# Patient Record
Sex: MALE | Race: NATIVE HAWAIIAN OR OTHER PACIFIC ISLANDER | Employment: FULL TIME | URBAN - METROPOLITAN AREA
[De-identification: names, ages, dates, MRNs, and addresses within clinical notes are randomized per-mention and may not be internally consistent; named-entity substitution may affect disease eponyms.]

---

## 2018-06-29 ENCOUNTER — OFFICE VISIT (OUTPATIENT)
Dept: OCCUPATIONAL MEDICINE | Facility: CLINIC | Age: 38
End: 2018-06-29

## 2018-06-29 ENCOUNTER — NON-PROVIDER VISIT (OUTPATIENT)
Dept: OCCUPATIONAL MEDICINE | Facility: CLINIC | Age: 38
End: 2018-06-29

## 2018-06-29 VITALS
SYSTOLIC BLOOD PRESSURE: 124 MMHG | OXYGEN SATURATION: 98 % | BODY MASS INDEX: 38.87 KG/M2 | TEMPERATURE: 98.2 F | HEIGHT: 72 IN | DIASTOLIC BLOOD PRESSURE: 78 MMHG | HEART RATE: 86 BPM | RESPIRATION RATE: 16 BRPM | WEIGHT: 287 LBS

## 2018-06-29 DIAGNOSIS — Z02.89 ENCOUNTER FOR OCCUPATIONAL HEALTH ASSESSMENT: ICD-10-CM

## 2018-06-29 DIAGNOSIS — Z02.89 ENCOUNTER FOR OCCUPATIONAL HEALTH EXAMINATION: ICD-10-CM

## 2018-06-29 PROCEDURE — 92553 AUDIOMETRY AIR & BONE: CPT | Performed by: PREVENTIVE MEDICINE

## 2018-06-29 PROCEDURE — 8915 PR COMPREHENSIVE PHYSICAL: Performed by: PREVENTIVE MEDICINE

## 2018-06-29 RX ORDER — LOSARTAN POTASSIUM 25 MG/1
25 TABLET ORAL DAILY
COMMUNITY
End: 2019-05-10 | Stop reason: SDUPTHER

## 2018-06-29 RX ORDER — ATORVASTATIN CALCIUM 10 MG/1
10 TABLET, FILM COATED ORAL NIGHTLY
COMMUNITY
End: 2019-05-10 | Stop reason: SDUPTHER

## 2018-06-29 ASSESSMENT — VISUAL ACUITY
OS_CC: 20/13
OD_CC: 20/20

## 2018-06-29 NOTE — PROGRESS NOTES
Patient's body mass index is 38.92 kg/m². Exercise and nutrition counseling were performed at this visit.

## 2018-07-27 ENCOUNTER — HOSPITAL ENCOUNTER (OUTPATIENT)
Dept: LAB | Facility: MEDICAL CENTER | Age: 38
End: 2018-07-27
Attending: NURSE PRACTITIONER
Payer: COMMERCIAL

## 2018-07-27 LAB
ALBUMIN SERPL BCP-MCNC: 4.7 G/DL (ref 3.2–4.9)
ALP SERPL-CCNC: 63 U/L (ref 30–99)
ALT SERPL-CCNC: 54 U/L (ref 2–50)
AST SERPL-CCNC: 40 U/L (ref 12–45)
BILIRUB CONJ SERPL-MCNC: 0.1 MG/DL (ref 0.1–0.5)
BILIRUB INDIRECT SERPL-MCNC: 0.6 MG/DL (ref 0–1)
BILIRUB SERPL-MCNC: 0.7 MG/DL (ref 0.1–1.5)
CRP SERPL HS-MCNC: 0.03 MG/DL (ref 0–0.75)
ERYTHROCYTE [SEDIMENTATION RATE] IN BLOOD BY WESTERGREN METHOD: 0 MM/HOUR (ref 0–15)
HAV IGM SERPL QL IA: NEGATIVE
HBV CORE IGM SER QL: NEGATIVE
HBV SURFACE AG SER QL: NEGATIVE
HCV AB SER QL: NEGATIVE
PROT SERPL-MCNC: 7.5 G/DL (ref 6–8.2)

## 2018-07-27 PROCEDURE — 86431 RHEUMATOID FACTOR QUANT: CPT

## 2018-07-27 PROCEDURE — 80076 HEPATIC FUNCTION PANEL: CPT

## 2018-07-27 PROCEDURE — 86225 DNA ANTIBODY NATIVE: CPT

## 2018-07-27 PROCEDURE — 86235 NUCLEAR ANTIGEN ANTIBODY: CPT | Mod: 91

## 2018-07-27 PROCEDURE — 36415 COLL VENOUS BLD VENIPUNCTURE: CPT

## 2018-07-27 PROCEDURE — 86038 ANTINUCLEAR ANTIBODIES: CPT

## 2018-07-27 PROCEDURE — 86140 C-REACTIVE PROTEIN: CPT

## 2018-07-27 PROCEDURE — 86200 CCP ANTIBODY: CPT

## 2018-07-27 PROCEDURE — 80074 ACUTE HEPATITIS PANEL: CPT

## 2018-07-27 PROCEDURE — 85652 RBC SED RATE AUTOMATED: CPT

## 2018-07-30 LAB
CCP IGG SERPL-ACNC: 4 UNITS (ref 0–19)
NUCLEAR IGG SER QL IA: NORMAL
RHEUMATOID FACT SER NEPH-ACNC: <10 IU/ML (ref 0–14)

## 2018-09-12 ENCOUNTER — OFFICE VISIT (OUTPATIENT)
Dept: MEDICAL GROUP | Facility: PHYSICIAN GROUP | Age: 38
End: 2018-09-12
Payer: COMMERCIAL

## 2018-09-12 VITALS
OXYGEN SATURATION: 96 % | TEMPERATURE: 97.3 F | HEART RATE: 88 BPM | BODY MASS INDEX: 38.87 KG/M2 | SYSTOLIC BLOOD PRESSURE: 120 MMHG | HEIGHT: 72 IN | DIASTOLIC BLOOD PRESSURE: 70 MMHG | WEIGHT: 287 LBS | RESPIRATION RATE: 16 BRPM

## 2018-09-12 DIAGNOSIS — Z00.00 WELLNESS EXAMINATION: ICD-10-CM

## 2018-09-12 DIAGNOSIS — M10.9 GOUT, UNSPECIFIED CAUSE, UNSPECIFIED CHRONICITY, UNSPECIFIED SITE: ICD-10-CM

## 2018-09-12 DIAGNOSIS — R74.8 ELEVATED LIVER ENZYMES: ICD-10-CM

## 2018-09-12 DIAGNOSIS — Z76.89 ENCOUNTER TO ESTABLISH CARE WITH NEW DOCTOR: ICD-10-CM

## 2018-09-12 PROCEDURE — 99203 OFFICE O/P NEW LOW 30 MIN: CPT | Performed by: NURSE PRACTITIONER

## 2018-09-12 ASSESSMENT — PATIENT HEALTH QUESTIONNAIRE - PHQ9: CLINICAL INTERPRETATION OF PHQ2 SCORE: 0

## 2018-09-12 NOTE — ASSESSMENT & PLAN NOTE
States that his previous provider was concerned about his liver.  Recent labs reviewed and everything looks ok.  Will recheck labs and go from there.

## 2018-09-12 NOTE — PROGRESS NOTES
Chief Complaint   Patient presents with   • Gout       HISTORY OF PRESENT ILLNESS: Patient is a 37 y.o. male new patient who presents today to discuss the following issues:    Encounter to establish care with new doctor  Is here to establish with a new primary care provider.  Was previously seen by Dr. Flores.    BMI 38.0-38.9,adult  Patient is aware of BMI elevation.  Brief discussion of diet, exercise, and lifestyle modification.      Gout  Chronic in nature, stable on meds.    Elevated liver enzymes  States that his previous provider was concerned about his liver.  Recent labs reviewed and everything looks ok.  Will recheck labs and go from there.      Patient Active Problem List    Diagnosis Date Noted   • Encounter to establish care with new doctor 09/12/2018   • BMI 38.0-38.9,adult 09/12/2018   • Gout 09/12/2018   • Elevated liver enzymes 09/12/2018       Allergies:Patient has no known allergies.    Current Outpatient Prescriptions   Medication Sig Dispense Refill   • Febuxostat (ULORIC PO) Take  by mouth.     • losartan (COZAAR) 25 MG Tab Take 25 mg by mouth every day.     • atorvastatin (LIPITOR) 10 MG Tab Take 10 mg by mouth every evening.       No current facility-administered medications for this visit.        Social History   Substance Use Topics   • Smoking status: Never Smoker   • Smokeless tobacco: Never Used   • Alcohol use Yes       No family status information on file.     Family History   Problem Relation Age of Onset   • Diabetes Paternal Grandmother    • Diabetes Paternal Grandfather        Review of Systems:   Constitutional: Negative for fever, chills, weight loss and malaise/fatigue.   HENT: Negative for ear pain, nosebleeds, congestion, sore throat and neck pain.    Eyes: Negative for blurred vision.   Respiratory: Negative for cough, sputum production, shortness of breath and wheezing.    Cardiovascular: Negative for chest pain, palpitations, orthopnea and leg swelling.    Gastrointestinal: Negative for heartburn, nausea, vomiting and abdominal pain.   Genitourinary: Negative for dysuria, urgency and frequency.   Musculoskeletal: Negative for myalgias, joint pain, and back pain.  Skin: Negative for rash and itching.   Neurological: Negative for dizziness, tingling, tremors, sensory change, focal weakness and headaches.   Endo/Heme/Allergies: Does not bruise/bleed easily.   Psychiatric/Behavioral: Negative for depression, suicidal ideas and memory loss.  The patient is not nervous/anxious and does not have insomnia.    All other systems reviewed and are negative except as in HPI.    Exam:  Blood pressure 120/70, pulse 88, temperature 36.3 °C (97.3 °F), resp. rate 16, height 1.829 m (6'), weight (!) 130.2 kg (287 lb), SpO2 96 %.  General:  Well nourished, well developed male in NAD  Head: Grossly normal.  Neck: Supple without JVD or bruit. Thyroid is not enlarged.  Pulmonary: Clear to ausculation. Normal effort. No rales, ronchi, or wheezing.  Cardiovascular: Regular rate and rhythm without murmur.   Abdomen:  Bowel sounds + x 4. Soft, non-tender, nondistended.  Extremities: No clubbing, cyanosis, or edema.  Skin: Intact with no obvious rashes or lesions.  Neuro: Grossly intact.  Psych: Alert and oriented x 3.  Mood and affect appropriate.    Medical decision-making and discussion: Ryan is here to establish with a new primary care provider.  We reviewed his past medical history and discussed his current medications.  Lab work was ordered. He will sign a records release for his previous provider, he will sign up with [facility], and he will plan to follow-up here as needed.         Assessment/Plan:  1. Encounter to establish care with new doctor     2. BMI 38.0-38.9,adult  Patient identified as having weight management issue.  Appropriate orders and counseling given.   3. Wellness examination  CBC WITH DIFFERENTIAL    COMP METABOLIC PANEL    LIPID PROFILE    VITAMIN D,25 HYDROXY   4.  Gout, unspecified cause, unspecified chronicity, unspecified site     5. Elevated liver enzymes         Return if symptoms worsen or fail to improve.    Please note that this dictation was created using voice recognition software. I have made every reasonable attempt to correct obvious errors, but I expect that there are errors of grammar and possibly content that I did not discover before finalizing the note.

## 2018-10-12 ENCOUNTER — HOSPITAL ENCOUNTER (OUTPATIENT)
Dept: LAB | Facility: MEDICAL CENTER | Age: 38
End: 2018-10-12
Attending: NURSE PRACTITIONER
Payer: COMMERCIAL

## 2018-10-12 DIAGNOSIS — Z00.00 WELLNESS EXAMINATION: ICD-10-CM

## 2018-10-12 LAB
25(OH)D3 SERPL-MCNC: 19 NG/ML (ref 30–100)
ALBUMIN SERPL BCP-MCNC: 4.5 G/DL (ref 3.2–4.9)
ALBUMIN/GLOB SERPL: 1.3 G/DL
ALP SERPL-CCNC: 58 U/L (ref 30–99)
ALT SERPL-CCNC: 61 U/L (ref 2–50)
ANION GAP SERPL CALC-SCNC: 7 MMOL/L (ref 0–11.9)
AST SERPL-CCNC: 41 U/L (ref 12–45)
BASOPHILS # BLD AUTO: 0.6 % (ref 0–1.8)
BASOPHILS # BLD: 0.03 K/UL (ref 0–0.12)
BILIRUB SERPL-MCNC: 0.6 MG/DL (ref 0.1–1.5)
BUN SERPL-MCNC: 17 MG/DL (ref 8–22)
CALCIUM SERPL-MCNC: 9.7 MG/DL (ref 8.5–10.5)
CHLORIDE SERPL-SCNC: 108 MMOL/L (ref 96–112)
CHOLEST SERPL-MCNC: 199 MG/DL (ref 100–199)
CO2 SERPL-SCNC: 24 MMOL/L (ref 20–33)
CREAT SERPL-MCNC: 0.98 MG/DL (ref 0.5–1.4)
EOSINOPHIL # BLD AUTO: 0.22 K/UL (ref 0–0.51)
EOSINOPHIL NFR BLD: 4.4 % (ref 0–6.9)
ERYTHROCYTE [DISTWIDTH] IN BLOOD BY AUTOMATED COUNT: 45.3 FL (ref 35.9–50)
FASTING STATUS PATIENT QL REPORTED: NORMAL
GLOBULIN SER CALC-MCNC: 3.4 G/DL (ref 1.9–3.5)
GLUCOSE SERPL-MCNC: 116 MG/DL (ref 65–99)
HCT VFR BLD AUTO: 50.2 % (ref 42–52)
HDLC SERPL-MCNC: 48 MG/DL
HGB BLD-MCNC: 16.7 G/DL (ref 14–18)
IMM GRANULOCYTES # BLD AUTO: 0.01 K/UL (ref 0–0.11)
IMM GRANULOCYTES NFR BLD AUTO: 0.2 % (ref 0–0.9)
LDLC SERPL CALC-MCNC: 134 MG/DL
LYMPHOCYTES # BLD AUTO: 1.71 K/UL (ref 1–4.8)
LYMPHOCYTES NFR BLD: 34.2 % (ref 22–41)
MCH RBC QN AUTO: 30.5 PG (ref 27–33)
MCHC RBC AUTO-ENTMCNC: 33.3 G/DL (ref 33.7–35.3)
MCV RBC AUTO: 91.8 FL (ref 81.4–97.8)
MONOCYTES # BLD AUTO: 0.54 K/UL (ref 0–0.85)
MONOCYTES NFR BLD AUTO: 10.8 % (ref 0–13.4)
NEUTROPHILS # BLD AUTO: 2.49 K/UL (ref 1.82–7.42)
NEUTROPHILS NFR BLD: 49.8 % (ref 44–72)
NRBC # BLD AUTO: 0 K/UL
NRBC BLD-RTO: 0 /100 WBC
PLATELET # BLD AUTO: 248 K/UL (ref 164–446)
PMV BLD AUTO: 10 FL (ref 9–12.9)
POTASSIUM SERPL-SCNC: 4.1 MMOL/L (ref 3.6–5.5)
PROT SERPL-MCNC: 7.9 G/DL (ref 6–8.2)
RBC # BLD AUTO: 5.47 M/UL (ref 4.7–6.1)
SODIUM SERPL-SCNC: 139 MMOL/L (ref 135–145)
TRIGL SERPL-MCNC: 85 MG/DL (ref 0–149)
WBC # BLD AUTO: 5 K/UL (ref 4.8–10.8)

## 2018-10-12 PROCEDURE — 82306 VITAMIN D 25 HYDROXY: CPT

## 2018-10-12 PROCEDURE — 80053 COMPREHEN METABOLIC PANEL: CPT

## 2018-10-12 PROCEDURE — 80061 LIPID PANEL: CPT

## 2018-10-12 PROCEDURE — 36415 COLL VENOUS BLD VENIPUNCTURE: CPT

## 2018-10-12 PROCEDURE — 85025 COMPLETE CBC W/AUTO DIFF WBC: CPT

## 2018-11-13 ENCOUNTER — OFFICE VISIT (OUTPATIENT)
Dept: MEDICAL GROUP | Facility: PHYSICIAN GROUP | Age: 38
End: 2018-11-13
Payer: COMMERCIAL

## 2018-11-13 VITALS
TEMPERATURE: 98.6 F | SYSTOLIC BLOOD PRESSURE: 124 MMHG | WEIGHT: 293.6 LBS | RESPIRATION RATE: 12 BRPM | HEART RATE: 96 BPM | OXYGEN SATURATION: 94 % | BODY MASS INDEX: 39.77 KG/M2 | DIASTOLIC BLOOD PRESSURE: 88 MMHG | HEIGHT: 72 IN

## 2018-11-13 DIAGNOSIS — E78.5 HYPERLIPIDEMIA, UNSPECIFIED HYPERLIPIDEMIA TYPE: ICD-10-CM

## 2018-11-13 DIAGNOSIS — R73.09 ELEVATED GLUCOSE: ICD-10-CM

## 2018-11-13 DIAGNOSIS — R74.8 ELEVATED LIVER ENZYMES: ICD-10-CM

## 2018-11-13 DIAGNOSIS — R79.89 LOW VITAMIN D LEVEL: ICD-10-CM

## 2018-11-13 PROBLEM — Z76.89 ENCOUNTER TO ESTABLISH CARE WITH NEW DOCTOR: Status: RESOLVED | Noted: 2018-09-12 | Resolved: 2018-11-13

## 2018-11-13 PROCEDURE — 99214 OFFICE O/P EST MOD 30 MIN: CPT | Performed by: NURSE PRACTITIONER

## 2018-11-13 RX ORDER — ERGOCALCIFEROL 1.25 MG/1
CAPSULE ORAL
Qty: 16 CAP | Refills: 0 | Status: SHIPPED | OUTPATIENT
Start: 2018-11-13 | End: 2019-08-22

## 2018-11-14 NOTE — ASSESSMENT & PLAN NOTE
Reviewed recent test results.  His fasting glucose was elevated at 116.  He will work on diet and lifestyle modification and will recheck labs in 3 months.

## 2018-11-14 NOTE — PROGRESS NOTES
Chief Complaint   Patient presents with   • Other     f/v lab results   • Gout     medication       HISTORY OF PRESENT ILLNESS: Patient is a 38 y.o. male established patient who presents today to discuss the following issues:    Elevated glucose  Reviewed recent test results.  His fasting glucose was elevated at 116.  He will work on diet and lifestyle modification and will recheck labs in 3 months.    Elevated liver enzymes  ALT slightly up at 61.  If elevated again in 3 months will proceed with ultrasound.    Hyperlipidemia  Stable on meds.    Low vitamin D level  Ergocalciferol sent to his pharmacy.      Patient Active Problem List    Diagnosis Date Noted   • Elevated glucose 11/13/2018   • Hyperlipidemia 11/13/2018   • Low vitamin D level 11/13/2018   • BMI 38.0-38.9,adult 09/12/2018   • Gout 09/12/2018   • Elevated liver enzymes 09/12/2018       Allergies:Patient has no known allergies.    Current Outpatient Prescriptions   Medication Sig Dispense Refill   • ALLOPURINOL PO Take  by mouth.     • ergocalciferol (DRISDOL) 09924 UNIT capsule Take 1 cap by mouth every day for 10 days, and then take 1 cap by mouth every Tuesday and Saturday until prescription is completed. 16 Cap 0   • losartan (COZAAR) 25 MG Tab Take 25 mg by mouth every day.     • atorvastatin (LIPITOR) 10 MG Tab Take 10 mg by mouth every evening.     • Febuxostat (ULORIC PO) Take  by mouth.       No current facility-administered medications for this visit.        Social History   Substance Use Topics   • Smoking status: Never Smoker   • Smokeless tobacco: Never Used   • Alcohol use Yes       Family Status   Relation Status   • PGMo (Not Specified)   • PGFa (Not Specified)     Family History   Problem Relation Age of Onset   • Diabetes Paternal Grandmother    • Diabetes Paternal Grandfather        Review of Systems:   Constitutional: Negative for fever, chills, weight loss and malaise/fatigue.   HENT: Negative for ear pain, nosebleeds, congestion,  sore throat and neck pain.    Eyes: Negative for blurred vision.   Respiratory: Negative for cough, sputum production, shortness of breath and wheezing.    Cardiovascular: Negative for chest pain, palpitations, orthopnea and leg swelling.   Gastrointestinal: Negative for heartburn, nausea, vomiting and abdominal pain.   Genitourinary: Negative for dysuria, urgency and frequency.   Musculoskeletal: Negative for myalgias, joint pain, and back pain.  Skin: Negative for rash and itching.   Neurological: Negative for dizziness, tingling, tremors, sensory change, focal weakness and headaches.   Endo/Heme/Allergies: Does not bruise/bleed easily.   Psychiatric/Behavioral: Negative for depression, suicidal ideas and memory loss.  The patient is not nervous/anxious and does not have insomnia.    All other systems reviewed and are negative except as in HPI.    Exam:  Blood pressure 124/88, pulse 96, temperature 37 °C (98.6 °F), temperature source Temporal, resp. rate 12, height 1.829 m (6'), weight (!) 133.2 kg (293 lb 9.6 oz), SpO2 94 %.  General:  Well nourished, well developed male in NAD  Head: Grossly normal.  Neck: Supple without JVD or bruit. Thyroid is not enlarged.  Pulmonary: Clear to ausculation. Normal effort. No rales, ronchi, or wheezing.  Cardiovascular: Regular rate and rhythm without murmur.   Abdomen:  Soft, nontender, nondistended.  Extremities: No clubbing, cyanosis, or edema.  Skin: Intact with no obvious rashes or lesions.  Neuro: Grossly intact.  Psych: Alert and oriented x 3.  Mood and affect appropriate.    Medical decision-making and discussion: Ryan is here today for follow-up.  We reviewed recent test results, vitamin d was sent to his pharmacy, he will decrease his sugar intake, and he will recheck labs in 3 months.  He will follow-up here as needed.          Assessment/Plan:  1. Low vitamin D level  ergocalciferol (DRISDOL) 69819 UNIT capsule    VITAMIN D,25 HYDROXY   2. Hyperlipidemia,  unspecified hyperlipidemia type  Lipid Profile   3. Elevated glucose  COMP METABOLIC PANEL    HEMOGLOBIN A1C   4. Elevated liver enzymes         Return if symptoms worsen or fail to improve.    Please note that this dictation was created using voice recognition software. I have made every reasonable attempt to correct obvious errors, but I expect that there are errors of grammar and possibly content that I did not discover before finalizing the note.

## 2018-12-08 DIAGNOSIS — R79.89 LOW VITAMIN D LEVEL: ICD-10-CM

## 2018-12-10 RX ORDER — ERGOCALCIFEROL 1.25 MG/1
CAPSULE ORAL
Refills: 0 | OUTPATIENT
Start: 2018-12-10

## 2018-12-10 NOTE — TELEPHONE ENCOUNTER
Was the patient seen in the last year in this department? Yes    Does patient have an active prescription for medications requested? No     Received Request Via: Pharmacy      Pt met protocol?: Yes  pt last ov 11/2018   25-Hydroxy   Vitamin D 25   Date Value Ref Range Status   10/12/2018 19 (L) 30 - 100 ng/mL Final     Comment:     Adult Ranges:   <20 ng/mL - Deficiency  20-29 ng/mL - Insufficiency   ng/mL - Sufficiency  The [employer] Centaur Vitamin D Assay is standardized to the  [facility] reference measurement procedures, a  reference method for the Vitamin D Standardization Program  (VDSP).  The VDSP aligns patient results among 25 (OH)  Vitamin D methods.

## 2019-05-13 RX ORDER — ATORVASTATIN CALCIUM 20 MG/1
20 TABLET, FILM COATED ORAL DAILY
Qty: 90 TAB | Refills: 1 | Status: SHIPPED | OUTPATIENT
Start: 2019-05-13 | End: 2019-12-05 | Stop reason: SDUPTHER

## 2019-05-13 RX ORDER — LOSARTAN POTASSIUM 50 MG/1
50 TABLET ORAL DAILY
Qty: 90 TAB | Refills: 1 | Status: SHIPPED | OUTPATIENT
Start: 2019-05-13 | End: 2019-12-05 | Stop reason: SDUPTHER

## 2019-05-13 NOTE — TELEPHONE ENCOUNTER
Pt has had OV within the 12 month protocol and lipid panel is current. 6 month supply sent to pharmacy.   Lab Results   Component Value Date/Time    CHOLSTRLTOT 199 10/12/2018 08:00 AM     (H) 10/12/2018 08:00 AM    HDL 48 10/12/2018 08:00 AM    TRIGLYCERIDE 85 10/12/2018 08:00 AM       Lab Results   Component Value Date/Time    SODIUM 139 10/12/2018 08:00 AM    POTASSIUM 4.1 10/12/2018 08:00 AM    CHLORIDE 108 10/12/2018 08:00 AM    CO2 24 10/12/2018 08:00 AM    GLUCOSE 116 (H) 10/12/2018 08:00 AM    BUN 17 10/12/2018 08:00 AM    CREATININE 0.98 10/12/2018 08:00 AM     Lab Results   Component Value Date/Time    ALKPHOSPHAT 58 10/12/2018 08:00 AM    ASTSGOT 41 10/12/2018 08:00 AM    ALTSGPT 61 (H) 10/12/2018 08:00 AM    TBILIRUBIN 0.6 10/12/2018 08:00 AM

## 2019-05-15 RX ORDER — FEBUXOSTAT 80 MG/1
80 TABLET, FILM COATED ORAL DAILY
Qty: 90 TAB | Refills: 3 | Status: SHIPPED | OUTPATIENT
Start: 2019-05-15 | End: 2020-03-25 | Stop reason: SDUPTHER

## 2019-05-21 ENCOUNTER — TELEPHONE (OUTPATIENT)
Dept: MEDICAL GROUP | Facility: PHYSICIAN GROUP | Age: 39
End: 2019-05-21

## 2019-05-21 NOTE — TELEPHONE ENCOUNTER
FINAL PRIOR AUTHORIZATION STATUS:    1.  Name of Medication & Dose: Uloric 80 mg tab     2. Prior Auth Status: Denied.  Reason: medication is excluded from the pt's benefit.      3. Action Taken: Pharmacy Notified: no Patient Notified: no    Insurance states allopurinol is covered

## 2019-05-21 NOTE — TELEPHONE ENCOUNTER
DOCUMENTATION OF PAR STATUS:    1. Name of Medication & Dose: Uloric     2. Name of Prescription Coverage Company & phone #: [employer] [phone]    3. Date Prior Auth Submitted: 05/21/19    4. What information was given to obtain insurance decision? Cover My Meds    5. Prior Auth Status? Pending    6. Patient Notified: no

## 2019-05-22 NOTE — TELEPHONE ENCOUNTER
We should probably clarify with the patient, but I believe that he has tried and failed allopurinol.  He has been stable on Uloric since before he established with me.

## 2019-08-22 ENCOUNTER — OFFICE VISIT (OUTPATIENT)
Dept: MEDICAL GROUP | Facility: PHYSICIAN GROUP | Age: 39
End: 2019-08-22
Payer: COMMERCIAL

## 2019-08-22 VITALS
WEIGHT: 296 LBS | HEIGHT: 72 IN | DIASTOLIC BLOOD PRESSURE: 88 MMHG | OXYGEN SATURATION: 95 % | TEMPERATURE: 98.4 F | SYSTOLIC BLOOD PRESSURE: 118 MMHG | BODY MASS INDEX: 40.09 KG/M2 | RESPIRATION RATE: 16 BRPM | HEART RATE: 75 BPM

## 2019-08-22 DIAGNOSIS — Z00.00 ENCOUNTER FOR WELLNESS EXAMINATION: ICD-10-CM

## 2019-08-22 DIAGNOSIS — R73.09 ELEVATED GLUCOSE: ICD-10-CM

## 2019-08-22 PROCEDURE — 99395 PREV VISIT EST AGE 18-39: CPT | Performed by: NURSE PRACTITIONER

## 2019-08-22 SDOH — HEALTH STABILITY: MENTAL HEALTH: HOW OFTEN DO YOU HAVE A DRINK CONTAINING ALCOHOL?: NEVER

## 2019-08-22 ASSESSMENT — PATIENT HEALTH QUESTIONNAIRE - PHQ9: CLINICAL INTERPRETATION OF PHQ2 SCORE: 0

## 2019-08-22 NOTE — PROGRESS NOTES
Chief Complaint   Patient presents with   • Hypertension     Concerns        HISTORY OF PRESENT ILLNESS: Patient is a 38 y.o. male established patient who presents today to discuss the following issues:    Encounter for wellness examination  Is here for his annual wellness visit.  He has no current questions or concerns.    Elevated glucose  He plans to have his follow-up lab work done soon.      Patient Active Problem List    Diagnosis Date Noted   • Encounter for wellness examination 08/22/2019   • Elevated glucose 11/13/2018   • Hyperlipidemia 11/13/2018   • Low vitamin D level 11/13/2018   • BMI 38.0-38.9,adult 09/12/2018   • Gout 09/12/2018   • Elevated liver enzymes 09/12/2018       Allergies:Patient has no known allergies.    Current Outpatient Medications   Medication Sig Dispense Refill   • febuxostat (ULORIC) 80 MG Tab Take 1 Tab by mouth every day. 90 Tab 3   • atorvastatin (LIPITOR) 20 MG Tab Take 1 Tab by mouth every day. 90 Tab 1   • losartan (COZAAR) 50 MG Tab Take 1 Tab by mouth every day. 90 Tab 1     No current facility-administered medications for this visit.        Social History     Tobacco Use   • Smoking status: Never Smoker   • Smokeless tobacco: Never Used   Substance Use Topics   • Alcohol use: Not Currently     Frequency: Never   • Drug use: No       Family Status   Relation Name Status   • PGMo  (Not Specified)   • PGFa  (Not Specified)     Family History   Problem Relation Age of Onset   • Diabetes Paternal Grandmother    • Diabetes Paternal Grandfather        Review of Systems:   Constitutional: Negative for fever, chills, weight loss and malaise/fatigue.   HENT: Negative for ear pain, nosebleeds, congestion, sore throat and neck pain.    Eyes: Negative for blurred vision.   Respiratory: Negative for cough, sputum production, shortness of breath and wheezing.    Cardiovascular: Negative for chest pain, palpitations, orthopnea and leg swelling.   Gastrointestinal: Negative for heartburn,  nausea, vomiting and abdominal pain.   Genitourinary: Negative for dysuria, urgency and frequency.   Musculoskeletal: Negative for myalgias, joint pain, and back pain.  Skin: Negative for rash and itching.   Neurological: Negative for dizziness, tingling, tremors, sensory change, focal weakness and headaches.   Endo/Heme/Allergies: Does not bruise/bleed easily.   Psychiatric/Behavioral: Negative for depression, suicidal ideas and memory loss.  The patient is not nervous/anxious and does not have insomnia.    All other systems reviewed and are negative except as in HPI.    Exam:  /88   Pulse 75   Temp 36.9 °C (98.4 °F)   Resp 16   Ht 1.829 m (6')   Wt (!) 134.3 kg (296 lb)   SpO2 95%   General:  Well nourished, well developed male in NAD  Head: Grossly normal.  Neck: Supple without JVD or bruit. Thyroid is not enlarged.  Pulmonary: Clear to ausculation. Normal effort. No rales, ronchi, or wheezing.  Cardiovascular: Regular rate and rhythm without murmur.   Abdomen:  Soft, nontender, nondistended.  Extremities: No clubbing, cyanosis, or edema.  Skin: Intact with no obvious rashes or lesions.  Neuro: Grossly intact.  Psych: Alert and oriented x 3.  Mood and affect appropriate.    Medical decision-making and discussion: Ryan is here today to discuss wellness.  [facility] records were reviewed and his form was completed.  He will have his lab work done soon, and he will follow-up here as needed.          Assessment/Plan:  1. Encounter for wellness examination     2. Elevated glucose         Return if symptoms worsen or fail to improve.    Please note that this dictation was created using voice recognition software. I have made every reasonable attempt to correct obvious errors, but I expect that there are errors of grammar and possibly content that I did not discover before finalizing the note.

## 2019-11-15 ENCOUNTER — HOSPITAL ENCOUNTER (OUTPATIENT)
Dept: LAB | Facility: MEDICAL CENTER | Age: 39
End: 2019-11-15
Attending: NURSE PRACTITIONER
Payer: COMMERCIAL

## 2019-12-05 DIAGNOSIS — E78.5 HYPERLIPIDEMIA, UNSPECIFIED HYPERLIPIDEMIA TYPE: ICD-10-CM

## 2019-12-05 DIAGNOSIS — R79.89 LOW VITAMIN D LEVEL: ICD-10-CM

## 2019-12-09 RX ORDER — LOSARTAN POTASSIUM 50 MG/1
TABLET ORAL
Qty: 30 TAB | Refills: 0 | Status: SHIPPED | OUTPATIENT
Start: 2019-12-09 | End: 2020-01-21

## 2019-12-09 RX ORDER — ATORVASTATIN CALCIUM 20 MG/1
TABLET, FILM COATED ORAL
Qty: 30 TAB | Refills: 0 | Status: SHIPPED | OUTPATIENT
Start: 2019-12-09 | End: 2020-01-21

## 2019-12-09 NOTE — TELEPHONE ENCOUNTER
Was the patient seen in the last year in this department? Yes    Does patient have an active prescription for medications requested? No     Received Request Via: Patient      Pt met protocol?: Yes, OV 8/19   Lab Results   Component Value Date/Time    CHOLSTRLTOT 199 10/12/2018 08:00 AM     (H) 10/12/2018 08:00 AM    HDL 48 10/12/2018 08:00 AM    TRIGLYCERIDE 85 10/12/2018 08:00 AM       Lab Results   Component Value Date/Time    SODIUM 139 10/12/2018 08:00 AM    POTASSIUM 4.1 10/12/2018 08:00 AM    CHLORIDE 108 10/12/2018 08:00 AM    CO2 24 10/12/2018 08:00 AM    GLUCOSE 116 (H) 10/12/2018 08:00 AM    BUN 17 10/12/2018 08:00 AM    CREATININE 0.98 10/12/2018 08:00 AM     Lab Results   Component Value Date/Time    ALKPHOSPHAT 58 10/12/2018 08:00 AM    ASTSGOT 41 10/12/2018 08:00 AM    ALTSGPT 61 (H) 10/12/2018 08:00 AM    TBILIRUBIN 0.6 10/12/2018 08:00 AM     BP Readings from Last 1 Encounters:   08/22/19 118/88

## 2019-12-27 ENCOUNTER — HOSPITAL ENCOUNTER (OUTPATIENT)
Dept: LAB | Facility: MEDICAL CENTER | Age: 39
End: 2019-12-27
Attending: NURSE PRACTITIONER
Payer: COMMERCIAL

## 2019-12-27 LAB
25(OH)D3 SERPL-MCNC: 21 NG/ML (ref 30–100)
ALBUMIN SERPL BCP-MCNC: 4.4 G/DL (ref 3.2–4.9)
ALBUMIN/GLOB SERPL: 1.4 G/DL
ALP SERPL-CCNC: 64 U/L (ref 30–99)
ALT SERPL-CCNC: 55 U/L (ref 2–50)
ANION GAP SERPL CALC-SCNC: 6 MMOL/L (ref 0–11.9)
AST SERPL-CCNC: 38 U/L (ref 12–45)
BILIRUB SERPL-MCNC: 0.7 MG/DL (ref 0.1–1.5)
BUN SERPL-MCNC: 13 MG/DL (ref 8–22)
CALCIUM SERPL-MCNC: 9.6 MG/DL (ref 8.5–10.5)
CHLORIDE SERPL-SCNC: 104 MMOL/L (ref 96–112)
CHOLEST SERPL-MCNC: 186 MG/DL (ref 100–199)
CO2 SERPL-SCNC: 27 MMOL/L (ref 20–33)
CREAT SERPL-MCNC: 0.96 MG/DL (ref 0.5–1.4)
EST. AVERAGE GLUCOSE BLD GHB EST-MCNC: 137 MG/DL
FASTING STATUS PATIENT QL REPORTED: NORMAL
GLOBULIN SER CALC-MCNC: 3.2 G/DL (ref 1.9–3.5)
GLUCOSE SERPL-MCNC: 114 MG/DL (ref 65–99)
HBA1C MFR BLD: 6.4 % (ref 0–5.6)
HDLC SERPL-MCNC: 47 MG/DL
LDLC SERPL CALC-MCNC: 110 MG/DL
POTASSIUM SERPL-SCNC: 4.2 MMOL/L (ref 3.6–5.5)
PROT SERPL-MCNC: 7.6 G/DL (ref 6–8.2)
SODIUM SERPL-SCNC: 137 MMOL/L (ref 135–145)
TRIGL SERPL-MCNC: 144 MG/DL (ref 0–149)

## 2019-12-27 PROCEDURE — 36415 COLL VENOUS BLD VENIPUNCTURE: CPT

## 2019-12-27 PROCEDURE — 82306 VITAMIN D 25 HYDROXY: CPT

## 2019-12-27 PROCEDURE — 80053 COMPREHEN METABOLIC PANEL: CPT

## 2019-12-27 PROCEDURE — 80061 LIPID PANEL: CPT

## 2019-12-27 PROCEDURE — 83036 HEMOGLOBIN GLYCOSYLATED A1C: CPT

## 2020-01-17 DIAGNOSIS — E78.5 HYPERLIPIDEMIA, UNSPECIFIED HYPERLIPIDEMIA TYPE: ICD-10-CM

## 2020-01-21 RX ORDER — ATORVASTATIN CALCIUM 20 MG/1
TABLET, FILM COATED ORAL
Qty: 90 TAB | Refills: 1 | Status: SHIPPED | OUTPATIENT
Start: 2020-01-21 | End: 2020-03-23 | Stop reason: SDUPTHER

## 2020-01-21 RX ORDER — LOSARTAN POTASSIUM 50 MG/1
50 TABLET ORAL DAILY
Qty: 90 TAB | Refills: 1 | Status: SHIPPED | OUTPATIENT
Start: 2020-01-21 | End: 2020-03-23 | Stop reason: SDUPTHER

## 2020-02-13 ENCOUNTER — OFFICE VISIT (OUTPATIENT)
Dept: MEDICAL GROUP | Facility: PHYSICIAN GROUP | Age: 40
End: 2020-02-13
Payer: COMMERCIAL

## 2020-02-13 VITALS
RESPIRATION RATE: 18 BRPM | BODY MASS INDEX: 39.82 KG/M2 | HEIGHT: 72 IN | TEMPERATURE: 97.8 F | DIASTOLIC BLOOD PRESSURE: 78 MMHG | WEIGHT: 294 LBS | HEART RATE: 76 BPM | OXYGEN SATURATION: 97 % | SYSTOLIC BLOOD PRESSURE: 122 MMHG

## 2020-02-13 DIAGNOSIS — R73.09 ELEVATED GLUCOSE: ICD-10-CM

## 2020-02-13 DIAGNOSIS — R79.89 LOW VITAMIN D LEVEL: ICD-10-CM

## 2020-02-13 DIAGNOSIS — Z23 NEED FOR VACCINATION: ICD-10-CM

## 2020-02-13 PROBLEM — Z00.00 ENCOUNTER FOR WELLNESS EXAMINATION: Status: RESOLVED | Noted: 2019-08-22 | Resolved: 2020-02-13

## 2020-02-13 PROCEDURE — 90715 TDAP VACCINE 7 YRS/> IM: CPT | Performed by: NURSE PRACTITIONER

## 2020-02-13 PROCEDURE — 99213 OFFICE O/P EST LOW 20 MIN: CPT | Mod: 25 | Performed by: NURSE PRACTITIONER

## 2020-02-13 PROCEDURE — 90471 IMMUNIZATION ADMIN: CPT | Performed by: NURSE PRACTITIONER

## 2020-02-13 RX ORDER — ERGOCALCIFEROL 1.25 MG/1
CAPSULE ORAL
Qty: 16 CAP | Refills: 0 | Status: SHIPPED | OUTPATIENT
Start: 2020-02-13 | End: 2020-09-24

## 2020-02-13 ASSESSMENT — PATIENT HEALTH QUESTIONNAIRE - PHQ9: CLINICAL INTERPRETATION OF PHQ2 SCORE: 0

## 2020-02-13 NOTE — PROGRESS NOTES
Chief Complaint   Patient presents with   • Lab Results       HISTORY OF PRESENT ILLNESS: Patient is a 39 y.o. male established patient who presents today to discuss the following issues:    Need for vaccination  Due for Tdap today.    Low vitamin D level  Vitamin D low.  Ergocalciferol sent to his pharmacy.    Elevated glucose  Fasting glucose was 114, but A1c was 6.4%.  We had a lengthy conversation about decreasing his carb intake.  He states that the biggest issue is white rice, and he will work on decreasing portion sizes.    BMI 39.0-39.9,adult  Patient is aware of BMI elevation.  Brief discussion of diet, exercise, and lifestyle modification.        Patient Active Problem List    Diagnosis Date Noted   • Need for vaccination 02/13/2020   • Elevated glucose 11/13/2018   • Hyperlipidemia 11/13/2018   • Low vitamin D level 11/13/2018   • BMI 39.0-39.9,adult 09/12/2018   • Gout 09/12/2018   • Elevated liver enzymes 09/12/2018       Allergies:Patient has no known allergies.    Current Outpatient Medications   Medication Sig Dispense Refill   • ergocalciferol (DRISDOL) 51641 UNIT capsule Take 1 cap by mouth every day for 10 days, and then take 1 cap by mouth every Tuesday and Saturday until prescription is completed. 16 Cap 0   • atorvastatin (LIPITOR) 20 MG Tab TAKE 1 TABLET BY MOUTH ONCE DAILY 90 Tab 1   • losartan (COZAAR) 50 MG Tab Take 1 Tab by mouth every day. 90 Tab 1   • febuxostat (ULORIC) 80 MG Tab Take 1 Tab by mouth every day. 90 Tab 3     No current facility-administered medications for this visit.        Social History     Tobacco Use   • Smoking status: Never Smoker   • Smokeless tobacco: Never Used   Substance Use Topics   • Alcohol use: Not Currently     Frequency: Never   • Drug use: No       Family Status   Relation Name Status   • PGMo  (Not Specified)   • PGFa  (Not Specified)     Family History   Problem Relation Age of Onset   • Diabetes Paternal Grandmother    • Diabetes Paternal Grandfather         Review of Systems:   Constitutional: Negative for fever, chills, weight loss and malaise/fatigue.   HENT: Negative for ear pain, nosebleeds, congestion, sore throat and neck pain.    Eyes: Negative for blurred vision.   Respiratory: Negative for cough, sputum production, shortness of breath and wheezing.    Cardiovascular: Negative for chest pain, palpitations, orthopnea and leg swelling.   Gastrointestinal: Negative for heartburn, nausea, vomiting and abdominal pain.   Genitourinary: Negative for dysuria, urgency and frequency.   Musculoskeletal: Negative for myalgias, joint pain, and back pain.  Skin: Negative for rash and itching.   Neurological: Negative for dizziness, tingling, tremors, sensory change, focal weakness and headaches.   Endo/Heme/Allergies: Does not bruise/bleed easily.   Psychiatric/Behavioral: Negative for depression, suicidal ideas and memory loss.  The patient is not nervous/anxious and does not have insomnia.    All other systems reviewed and are negative except as in HPI.    Exam:  Blood Pressure 122/78   Pulse 76   Temperature 36.6 °C (97.8 °F)   Respiration 18   Height 1.829 m (6')   Weight (Abnormal) 133.4 kg (294 lb)   Oxygen Saturation 97%   General:  Well nourished, well developed male in NAD  Head: Grossly normal.  Neck: Supple without JVD or bruit. Thyroid is not enlarged.  Pulmonary: Clear to ausculation. Normal effort. No rales, ronchi, or wheezing.  Cardiovascular: Regular rate and rhythm without murmur.   Abdomen:  Soft, nontender, nondistended.  Extremities: No clubbing, cyanosis, or edema.  Skin: Intact with no obvious rashes or lesions.  Neuro: Grossly intact.  Psych: Alert and oriented x 3.  Mood and affect appropriate.    Medical decision-making and discussion: Ryan is here today for follow-up.  His chart was reviewed and his lab results were discussed.  Ergocalciferol was sent to his pharmacy, he will decrease carb intake, and he was given a Tdap shot.  He  will follow-up here as needed.    I have placed the below orders and discussed them with an approved delegating provider. The MA is performing the below orders under the direction of Dr. Garcia, who have provided verbal consent for supervision.            Assessment/Plan:  1. Need for vaccination  Tdap Vaccine =>8YO IM   2. BMI 39.0-39.9,adult  Patient identified as having weight management issue.  Appropriate orders and counseling given.   3. Low vitamin D level     4. Elevated glucose         Return if symptoms worsen or fail to improve.    Please note that this dictation was created using voice recognition software. I have made every reasonable attempt to correct obvious errors, but I expect that there are errors of grammar and possibly content that I did not discover before finalizing the note.

## 2020-02-13 NOTE — ASSESSMENT & PLAN NOTE
Fasting glucose was 114, but A1c was 6.4%.  We had a lengthy conversation about decreasing his carb intake.  He states that the biggest issue is white rice, and he will work on decreasing portion sizes.

## 2020-03-23 DIAGNOSIS — M10.9 GOUT, UNSPECIFIED CAUSE, UNSPECIFIED CHRONICITY, UNSPECIFIED SITE: ICD-10-CM

## 2020-03-23 DIAGNOSIS — E78.5 HYPERLIPIDEMIA, UNSPECIFIED HYPERLIPIDEMIA TYPE: ICD-10-CM

## 2020-03-23 NOTE — TELEPHONE ENCOUNTER
----- Message from Ryan Lora sent at 3/23/2020 12:23 PM PDT -----  Regarding: Prescription Question  Contact: [phone]  Scot Zabala,  Please send my prescriptions to [facility] so I can get them delivered to my home. My insurance no longer covers maintenance medication filled at our local pharmacy.    Losartan Tabs50 mg  Atorvastatin Tabs20 mg  Febuxostat Tabs80 mg    Thanks!  Shannan

## 2020-03-25 RX ORDER — LOSARTAN POTASSIUM 50 MG/1
50 TABLET ORAL DAILY
Qty: 90 TAB | Refills: 1 | Status: SHIPPED | OUTPATIENT
Start: 2020-03-25 | End: 2020-08-31 | Stop reason: SDUPTHER

## 2020-03-25 RX ORDER — FEBUXOSTAT 80 MG/1
80 TABLET, FILM COATED ORAL DAILY
Qty: 90 TAB | Refills: 1 | Status: SHIPPED | OUTPATIENT
Start: 2020-03-25 | End: 2020-08-31 | Stop reason: SDUPTHER

## 2020-03-25 RX ORDER — ATORVASTATIN CALCIUM 20 MG/1
TABLET, FILM COATED ORAL
Qty: 90 TAB | Refills: 1 | Status: SHIPPED | OUTPATIENT
Start: 2020-03-25 | End: 2020-08-31 | Stop reason: SDUPTHER

## 2020-03-25 NOTE — TELEPHONE ENCOUNTER
Refill X 6 months, sent to pharmacy.Pt. Seen in the last 6 months per protocol.   Lab Results   Component Value Date/Time    SODIUM 137 12/27/2019 08:27 AM    POTASSIUM 4.2 12/27/2019 08:27 AM    CHLORIDE 104 12/27/2019 08:27 AM    CO2 27 12/27/2019 08:27 AM    GLUCOSE 114 (H) 12/27/2019 08:27 AM    BUN 13 12/27/2019 08:27 AM    CREATININE 0.96 12/27/2019 08:27 AM     Lab Results   Component Value Date/Time    ALKPHOSPHAT 64 12/27/2019 08:27 AM    ASTSGOT 38 12/27/2019 08:27 AM    ALTSGPT 55 (H) 12/27/2019 08:27 AM    TBILIRUBIN 0.7 12/27/2019 08:27 AM

## 2020-08-31 DIAGNOSIS — E78.5 HYPERLIPIDEMIA, UNSPECIFIED HYPERLIPIDEMIA TYPE: ICD-10-CM

## 2020-08-31 RX ORDER — FEBUXOSTAT 80 MG/1
80 TABLET, FILM COATED ORAL DAILY
Qty: 90 TAB | Refills: 0 | Status: SHIPPED | OUTPATIENT
Start: 2020-08-31 | End: 2020-11-30

## 2020-08-31 RX ORDER — LOSARTAN POTASSIUM 50 MG/1
50 TABLET ORAL DAILY
Qty: 90 TAB | Refills: 0 | Status: SHIPPED | OUTPATIENT
Start: 2020-08-31 | End: 2020-11-30

## 2020-08-31 RX ORDER — ATORVASTATIN CALCIUM 20 MG/1
TABLET, FILM COATED ORAL
Qty: 90 TAB | Refills: 0 | Status: SHIPPED | OUTPATIENT
Start: 2020-08-31 | End: 2020-11-30

## 2020-09-24 ENCOUNTER — OFFICE VISIT (OUTPATIENT)
Dept: MEDICAL GROUP | Facility: PHYSICIAN GROUP | Age: 40
End: 2020-09-24
Payer: COMMERCIAL

## 2020-09-24 VITALS
DIASTOLIC BLOOD PRESSURE: 88 MMHG | OXYGEN SATURATION: 96 % | HEART RATE: 76 BPM | TEMPERATURE: 97.9 F | SYSTOLIC BLOOD PRESSURE: 128 MMHG | RESPIRATION RATE: 16 BRPM | BODY MASS INDEX: 38.74 KG/M2 | WEIGHT: 286 LBS | HEIGHT: 72 IN

## 2020-09-24 DIAGNOSIS — R79.89 LOW VITAMIN D LEVEL: Chronic | ICD-10-CM

## 2020-09-24 DIAGNOSIS — E78.5 HYPERLIPIDEMIA, UNSPECIFIED HYPERLIPIDEMIA TYPE: Chronic | ICD-10-CM

## 2020-09-24 DIAGNOSIS — M10.9 GOUT, UNSPECIFIED CAUSE, UNSPECIFIED CHRONICITY, UNSPECIFIED SITE: Chronic | ICD-10-CM

## 2020-09-24 DIAGNOSIS — R74.8 ELEVATED LIVER ENZYMES: Chronic | ICD-10-CM

## 2020-09-24 DIAGNOSIS — Z00.00 ENCOUNTER FOR WELLNESS EXAMINATION: ICD-10-CM

## 2020-09-24 DIAGNOSIS — R73.09 ELEVATED GLUCOSE: ICD-10-CM

## 2020-09-24 PROCEDURE — 99204 OFFICE O/P NEW MOD 45 MIN: CPT | Performed by: INTERNAL MEDICINE

## 2020-09-24 ASSESSMENT — FIBROSIS 4 INDEX: FIB4 SCORE: 0.81

## 2020-09-24 NOTE — ASSESSMENT & PLAN NOTE
Chronic condition patient has not been taking vitamin D.  He is requesting blood test to follow-up.

## 2020-09-24 NOTE — ASSESSMENT & PLAN NOTE
Previous blood test show mildly elevated ALT.  Patient is asymptomatic.  This is likely caused by fatty liver.  Requesting follow-up blood test for follow-up.

## 2020-09-24 NOTE — ASSESSMENT & PLAN NOTE
Patient present today for a wellness exam and requesting health screening form to be filled out.  No new complaint.

## 2020-09-24 NOTE — PROGRESS NOTES
CC: Establish care  Health screening form to be filled out    HPI: Pt presents today to establish care.   Pt's medical history is notable for:    Encounter for wellness examination  Patient present today for a wellness exam and requesting health screening form to be filled out.  No new complaint.    Gout  Chronic stable condition patient currently taking Uloric.  No gout attacks recently.    Elevated liver enzymes  Previous blood test show mildly elevated ALT.  Patient is asymptomatic.  This is likely caused by fatty liver.  Requesting follow-up blood test for follow-up.    Hyperlipidemia  Chronic stable condition patient is now taking Lipitor.  No side effect reported.  Patient is due for blood test    Low vitamin D level  Chronic condition patient has not been taking vitamin D.  He is requesting blood test to follow-up.    Elevated glucose  This was noted with previous lab test.  Patient is due for blood test for follow-up.              REVIEW OF SYSTEMS:     Constitutional:  no fever / chills   Neurologic: no headaches, no numbness/tingling  Eyes: no changes in vision  ENT: no sore throat, no hearing loss  CV:  no chest pain, no palpitations  Pulmonary: no SOB, no cough    GI: no nausea / vomiting, no diarrhea, no constipation  :  no dysuria, no hematuria       Allergies: Patient has no known allergies.    Current Outpatient Medications Ordered in Epic   Medication Sig Dispense Refill   • atorvastatin (LIPITOR) 20 MG Tab TAKE 1 TABLET BY MOUTH ONCE DAILY 90 Tab 0   • febuxostat (ULORIC) 80 MG Tab Take 1 Tab by mouth every day. 90 Tab 0   • losartan (COZAAR) 50 MG Tab Take 1 Tab by mouth every day. 90 Tab 0     No current Epic-ordered facility-administered medications on file.        Past Medical History:   Diagnosis Date   • Gout         Past Surgical History:   Procedure Laterality Date   • ARTHROSCOPY, KNEE Right 2000    acl, mcl, meniscus repair        Family History   Problem Relation Age of Onset   •  Diabetes Paternal Grandmother    • Diabetes Paternal Grandfather         Social History     Tobacco Use   Smoking Status Never Smoker   Smokeless Tobacco Never Used          Social History     Substance and Sexual Activity   Alcohol Use Not Currently   • Frequency: Never        ---------------------------------------------------------------------    PHYSICAL EXAM:   Vitals:    09/24/20 0711   BP: 128/88   Pulse: 76   Resp: 16   Temp: 36.6 °C (97.9 °F)   SpO2: 96%     Body mass index is 38.79 kg/m².     Constitutional: no acute distress  Eyes: PERRL, EOMI  Ears/nose/mouth: OP no exudates  Neck: supple, no JVD  CV: heart RRR  Resp: normal effort, no wheezing or rales.  GI: abdomen soft, no obvious mass, no tenderness  Neuro: CN 2-12 grossly intact  Skin: no obvious rash noted  Psych: normal mood and affect  ---------------------------------------------------------------------    ASSESSMENT and PLAN:   1. Gout, unspecified cause, unspecified chronicity, unspecified site  Chronic stable condition.  Continue with Uloric.  Diet instruction given    2. Elevated liver enzymes  Lab test for follow-up.  Patient asymptomatic.  - HEMOGLOBIN A1C; Future  - Basic Metabolic Panel; Future  - CBC WITH DIFFERENTIAL; Future  - TSH; Future  - MICROALBUMIN CREAT RATIO URINE; Future  - HEPATIC FUNCTION PANEL; Future  - HEP A AB IGM; Future  - HEP B SURFACE AB; Future  - HEP B SURFACE ANTIGEN; Future  - HEP C VIRUS ANTIBODY; Future    3. Hyperlipidemia, unspecified hyperlipidemia type  Chronic stable condition continue with atorvastatin.  Lipid panel requested.  - Lipid Profile; Future    4. Low vitamin D level  Lab tests ordered.  - VITAMIN D,25 HYDROXY; Future    5. Elevated glucose  Advised the patient regarding low sweet low-carb diet.  Also recommend a regular exercise program.  Lab tests ordered for follow-up.          Return in about 6 months (around 3/24/2021).     PATIENT EDUCATION:  -If any problems should arise, patient was  advised to contact our office or go to ER to be evaluated.  -Advised pt to follow a healthy diet and regular aerobic exercise regimen. Advised pt to avoid alcohol and tobacco use.    Please note that this dictation was created using voice recognition software. I have made every reasonable attempt to correct obvious errors, but it is possible there are errors of grammar and possibly content that I did not discover before finalizing the note.

## 2020-09-24 NOTE — ASSESSMENT & PLAN NOTE
Chronic stable condition patient is now taking Lipitor.  No side effect reported.  Patient is due for blood test

## 2020-11-29 DIAGNOSIS — E78.5 HYPERLIPIDEMIA, UNSPECIFIED HYPERLIPIDEMIA TYPE: ICD-10-CM

## 2020-11-30 RX ORDER — FEBUXOSTAT 80 MG/1
TABLET, FILM COATED ORAL
Qty: 90 TAB | Refills: 3 | Status: SHIPPED | OUTPATIENT
Start: 2020-11-30 | End: 2021-12-01 | Stop reason: SDUPTHER

## 2020-11-30 RX ORDER — LOSARTAN POTASSIUM 50 MG/1
TABLET ORAL
Qty: 90 TAB | Refills: 3 | Status: SHIPPED | OUTPATIENT
Start: 2020-11-30 | End: 2021-12-01 | Stop reason: SDUPTHER

## 2020-11-30 RX ORDER — ATORVASTATIN CALCIUM 20 MG/1
TABLET, FILM COATED ORAL
Qty: 90 TAB | Refills: 3 | Status: SHIPPED | OUTPATIENT
Start: 2020-11-30 | End: 2021-12-01 | Stop reason: SDUPTHER

## 2021-03-03 ENCOUNTER — PATIENT MESSAGE (OUTPATIENT)
Dept: MEDICAL GROUP | Facility: PHYSICIAN GROUP | Age: 41
End: 2021-03-03

## 2021-03-03 ENCOUNTER — HOSPITAL ENCOUNTER (OUTPATIENT)
Dept: LAB | Facility: MEDICAL CENTER | Age: 41
End: 2021-03-03
Attending: INTERNAL MEDICINE
Payer: COMMERCIAL

## 2021-03-03 ENCOUNTER — TELEPHONE (OUTPATIENT)
Dept: MEDICAL GROUP | Facility: PHYSICIAN GROUP | Age: 41
End: 2021-03-03

## 2021-03-03 DIAGNOSIS — R73.03 PREDIABETES: ICD-10-CM

## 2021-03-03 DIAGNOSIS — E78.5 HYPERLIPIDEMIA, UNSPECIFIED HYPERLIPIDEMIA TYPE: Chronic | ICD-10-CM

## 2021-03-03 DIAGNOSIS — R74.8 ELEVATED LIVER ENZYMES: Chronic | ICD-10-CM

## 2021-03-03 DIAGNOSIS — R79.89 LOW VITAMIN D LEVEL: Chronic | ICD-10-CM

## 2021-03-03 DIAGNOSIS — E88.09 HYPERPROTEINEMIA: ICD-10-CM

## 2021-03-03 LAB
25(OH)D3 SERPL-MCNC: 21 NG/ML (ref 30–100)
ALBUMIN SERPL BCP-MCNC: 4.6 G/DL (ref 3.2–4.9)
ALP SERPL-CCNC: 76 U/L (ref 30–99)
ALT SERPL-CCNC: 33 U/L (ref 2–50)
ANION GAP SERPL CALC-SCNC: 11 MMOL/L (ref 7–16)
AST SERPL-CCNC: 33 U/L (ref 12–45)
BASOPHILS # BLD AUTO: 0.4 % (ref 0–1.8)
BASOPHILS # BLD: 0.02 K/UL (ref 0–0.12)
BILIRUB CONJ SERPL-MCNC: <0.2 MG/DL (ref 0.1–0.5)
BILIRUB INDIRECT SERPL-MCNC: ABNORMAL MG/DL (ref 0–1)
BILIRUB SERPL-MCNC: 0.7 MG/DL (ref 0.1–1.5)
BUN SERPL-MCNC: 11 MG/DL (ref 8–22)
CALCIUM SERPL-MCNC: 10.2 MG/DL (ref 8.5–10.5)
CHLORIDE SERPL-SCNC: 102 MMOL/L (ref 96–112)
CHOLEST SERPL-MCNC: 200 MG/DL (ref 100–199)
CO2 SERPL-SCNC: 25 MMOL/L (ref 20–33)
CREAT SERPL-MCNC: 0.95 MG/DL (ref 0.5–1.4)
CREAT UR-MCNC: 164.65 MG/DL
EOSINOPHIL # BLD AUTO: 0.15 K/UL (ref 0–0.51)
EOSINOPHIL NFR BLD: 3.3 % (ref 0–6.9)
ERYTHROCYTE [DISTWIDTH] IN BLOOD BY AUTOMATED COUNT: 45.1 FL (ref 35.9–50)
EST. AVERAGE GLUCOSE BLD GHB EST-MCNC: 134 MG/DL
FASTING STATUS PATIENT QL REPORTED: NORMAL
GLUCOSE SERPL-MCNC: 107 MG/DL (ref 65–99)
HAV IGM SERPL QL IA: NORMAL
HBA1C MFR BLD: 6.3 % (ref 4–5.6)
HBV SURFACE AB SERPL IA-ACNC: <3.5 MIU/ML (ref 0–10)
HBV SURFACE AG SER QL: NORMAL
HCT VFR BLD AUTO: 54.9 % (ref 42–52)
HCV AB SER QL: NORMAL
HDLC SERPL-MCNC: 50 MG/DL
HGB BLD-MCNC: 17.9 G/DL (ref 14–18)
IMM GRANULOCYTES # BLD AUTO: 0.01 K/UL (ref 0–0.11)
IMM GRANULOCYTES NFR BLD AUTO: 0.2 % (ref 0–0.9)
LDLC SERPL CALC-MCNC: 125 MG/DL
LYMPHOCYTES # BLD AUTO: 1.43 K/UL (ref 1–4.8)
LYMPHOCYTES NFR BLD: 31.3 % (ref 22–41)
MCH RBC QN AUTO: 30.4 PG (ref 27–33)
MCHC RBC AUTO-ENTMCNC: 32.6 G/DL (ref 33.7–35.3)
MCV RBC AUTO: 93.4 FL (ref 81.4–97.8)
MICROALBUMIN UR-MCNC: <1.2 MG/DL
MICROALBUMIN/CREAT UR: NORMAL MG/G (ref 0–30)
MONOCYTES # BLD AUTO: 0.41 K/UL (ref 0–0.85)
MONOCYTES NFR BLD AUTO: 9 % (ref 0–13.4)
NEUTROPHILS # BLD AUTO: 2.55 K/UL (ref 1.82–7.42)
NEUTROPHILS NFR BLD: 55.8 % (ref 44–72)
NRBC # BLD AUTO: 0 K/UL
NRBC BLD-RTO: 0 /100 WBC
PLATELET # BLD AUTO: 258 K/UL (ref 164–446)
PMV BLD AUTO: 9.7 FL (ref 9–12.9)
POTASSIUM SERPL-SCNC: 4.5 MMOL/L (ref 3.6–5.5)
PROT SERPL-MCNC: 8.3 G/DL (ref 6–8.2)
RBC # BLD AUTO: 5.88 M/UL (ref 4.7–6.1)
SODIUM SERPL-SCNC: 138 MMOL/L (ref 135–145)
TRIGL SERPL-MCNC: 124 MG/DL (ref 0–149)
TSH SERPL DL<=0.005 MIU/L-ACNC: 1.21 UIU/ML (ref 0.38–5.33)
WBC # BLD AUTO: 4.6 K/UL (ref 4.8–10.8)

## 2021-03-03 PROCEDURE — 82043 UR ALBUMIN QUANTITATIVE: CPT

## 2021-03-03 PROCEDURE — 83036 HEMOGLOBIN GLYCOSYLATED A1C: CPT

## 2021-03-03 PROCEDURE — 80076 HEPATIC FUNCTION PANEL: CPT

## 2021-03-03 PROCEDURE — 86709 HEPATITIS A IGM ANTIBODY: CPT

## 2021-03-03 PROCEDURE — 86803 HEPATITIS C AB TEST: CPT

## 2021-03-03 PROCEDURE — 82306 VITAMIN D 25 HYDROXY: CPT

## 2021-03-03 PROCEDURE — 80061 LIPID PANEL: CPT

## 2021-03-03 PROCEDURE — 36415 COLL VENOUS BLD VENIPUNCTURE: CPT

## 2021-03-03 PROCEDURE — 80048 BASIC METABOLIC PNL TOTAL CA: CPT

## 2021-03-03 PROCEDURE — 86706 HEP B SURFACE ANTIBODY: CPT

## 2021-03-03 PROCEDURE — 85025 COMPLETE CBC W/AUTO DIFF WBC: CPT

## 2021-03-03 PROCEDURE — 87340 HEPATITIS B SURFACE AG IA: CPT

## 2021-03-03 PROCEDURE — 84443 ASSAY THYROID STIM HORMONE: CPT

## 2021-03-03 PROCEDURE — 82570 ASSAY OF URINE CREATININE: CPT

## 2021-03-03 RX ORDER — ERGOCALCIFEROL 1.25 MG/1
50000 CAPSULE ORAL
Qty: 15 CAPSULE | Refills: 3 | Status: SHIPPED | OUTPATIENT
Start: 2021-03-03 | End: 2022-04-20

## 2021-03-03 NOTE — TELEPHONE ENCOUNTER
----- Message from Jairon Mccoy M.D. sent at 3/3/2021  3:06 PM PST -----    Please call patient :   Vitamin d level low. Pls start taking vit d 87751t once a week.    Sugar level slightly high =prediabetes.  Please start/continue with low sweet low carb diet, continue with regular exercises / walking and maintain an ideal weight.    Protein level slightly high.  Rec low protein diet. Pls also avoid otc protein supplements.  Pt to repeat protein lab test in 4 wks.    Cholesterol :    200                  [Desirable range = below 200]  LDL [bad cholesterol]:   125    [Desirable range = below 100]  Please start/continue with low fat low cholesterol diet

## 2021-03-03 NOTE — TELEPHONE ENCOUNTER
1st Attempt:    Left voicemail message for patient to call the office back at [phone]    Sent pt my chart message

## 2021-03-08 NOTE — TELEPHONE ENCOUNTER
From: Ryan Lora  To: Medical Assistant Leticia VERGARA  Sent: 3/8/2021 9:39 AM PST  Subject: Lab Results    Scot Kelly,     Does my results from the blood test/blood work have my Lipid Panel and Fasting Glucose? I believe the glucose was mentioned since it has my sugar level, but wasn't sure on my LP.    For my insurance, these items were needed for my biometric.      ----- Message -----   From:Medical Assistant Leticia VERGARA   Sent:3/3/2021 4:14 PM PST   To:Ryan Lora   Subject:RE: Lab Results    Thank you very much, take care!      ----- Message -----   From:Ryan Lora   Sent:3/3/2021 3:54 PM PST   To:Medical Assistant Leticia VERGARA   Subject:RE: Lab Results    Scot Kelly,    Thank you for the message. I also received your voicemail. Please disregard my voicemail. You don't have to return my call.     I'll schedule a physical with Dr. Mccoy.    Thank you again and have a good evening.       ----- Message -----   From:Medical Assistant Leticia VERGARA   Sent:3/3/2021 3:17 PM PST   To:Ryan Lora   Subject:Lab Results    Hi Ryan      Please review results from Jairon Mccoy M.D.     ----- Message from Jairon Mccoy M.D. sent at 3/3/2021 3:06 PM PST -----     Please call patient :   Vitamin d level low. Pls start taking vit d 70929b once a week.     Sugar level slightly high =prediabetes.  Please start/continue with low sweet low carb diet, continue with regular exercises / walking and maintain an ideal weight.     Protein level slightly high.  Rec low protein diet. Pls also avoid otc protein supplements.  Pt to repeat protein lab test in 4 wks.     Cholesterol : 200  [Desirable range = below 200]  LDL [bad cholesterol]: 125 [Desirable range = below 100]  Please start/continue with low fat low cholesterol diet                   If you have any questions or concerns, please call [phone] or send your provider a my chart message.    Thanks,  Leticia PARRY

## 2021-03-24 ENCOUNTER — OFFICE VISIT (OUTPATIENT)
Dept: MEDICAL GROUP | Facility: PHYSICIAN GROUP | Age: 41
End: 2021-03-24
Payer: COMMERCIAL

## 2021-03-24 VITALS
WEIGHT: 289 LBS | TEMPERATURE: 98.3 F | BODY MASS INDEX: 39.14 KG/M2 | HEIGHT: 72 IN | RESPIRATION RATE: 16 BRPM | OXYGEN SATURATION: 96 % | HEART RATE: 87 BPM | DIASTOLIC BLOOD PRESSURE: 86 MMHG | SYSTOLIC BLOOD PRESSURE: 126 MMHG

## 2021-03-24 DIAGNOSIS — E78.5 HYPERLIPIDEMIA, UNSPECIFIED HYPERLIPIDEMIA TYPE: Chronic | ICD-10-CM

## 2021-03-24 DIAGNOSIS — M10.9 GOUT, UNSPECIFIED CAUSE, UNSPECIFIED CHRONICITY, UNSPECIFIED SITE: Chronic | ICD-10-CM

## 2021-03-24 DIAGNOSIS — R79.89 LOW VITAMIN D LEVEL: Chronic | ICD-10-CM

## 2021-03-24 DIAGNOSIS — E88.09 HYPERPROTEINEMIA: Chronic | ICD-10-CM

## 2021-03-24 DIAGNOSIS — M25.512 ACUTE PAIN OF LEFT SHOULDER: ICD-10-CM

## 2021-03-24 PROBLEM — R74.8 ELEVATED LIVER ENZYMES: Chronic | Status: RESOLVED | Noted: 2018-09-12 | Resolved: 2021-03-24

## 2021-03-24 PROCEDURE — 99396 PREV VISIT EST AGE 40-64: CPT | Performed by: INTERNAL MEDICINE

## 2021-03-24 ASSESSMENT — PATIENT HEALTH QUESTIONNAIRE - PHQ9: CLINICAL INTERPRETATION OF PHQ2 SCORE: 0

## 2021-03-24 ASSESSMENT — FIBROSIS 4 INDEX: FIB4 SCORE: 0.89

## 2021-03-24 NOTE — PROGRESS NOTES
CC: Annual visit  Left shoulder pain  Discussed recent lab test result      HPI: This is a 40 y.o. pt.  Pt's medical history is notable for:     BMI 39.0-39.9,adult  This is chronic condition.   Pt is aware of elevated BMI.  Brief discussion with the patient regarding diet, exercise, and lifestyle modification to help achieve and maintain healthy weight        Hyperproteinemia  Recent lab test show elevated protein level at 8.3.  Patient is asymptomatic.  We have ordered to repeat routine and serum protein electrophoresis    Gout  Chronic condition.  The patient is presently taking Uloric.  Patient asymptomatic.    Low vitamin D level  Recent blood test showed low vitamin D level.  Advised the patient to start taking vitamin D 50,000 unit once a week.    Hyperlipidemia  Chronic condition.  The patient is presently taking atorvastatin.  No significant side effects reported.    Patient is stated that his has missed taking the medication from time to time.    Lab test showED  Results for TANIYA SANTOS (MRN [id number]) as of 3/24/2021 16:09   Ref. Range 3/3/2021 08:34   Cholesterol,Tot Latest Ref Range: 100 - 199 mg/dL 200 (H)   Triglycerides Latest Ref Range: 0 - 149 mg/dL 124   HDL Latest Ref Range: >=40 mg/dL 50   LDL Latest Ref Range: <100 mg/dL 125 (H)           REVIEW OF SYSTEMS:     Constitutional:  no fever / chills   Neurologic: no headaches  Eyes: no changes in vision  ENT: no sore throat, no hearing loss  CV:  no chest pain, no palpitations  Pulmonary: no SOB, no cough          Allergies: Patient has no known allergies.    Current Outpatient Medications Ordered in Epic   Medication Sig Dispense Refill   • ergocalciferol (DRISDOL) 24040 UNIT capsule Take 1 capsule by mouth every 7 days. 15 capsule 3   • losartan (COZAAR) 50 MG Tab TAKE 1 TABLET DAILY 90 Tab 3   • febuxostat (ULORIC) 80 MG Tab TAKE 1 TABLET DAILY 90 Tab 3   • atorvastatin (LIPITOR) 20 MG Tab TAKE 1 TABLET DAILY 90 Tab 3     No current  Epic-ordered facility-administered medications on file.       Past Medical, Social, and Family history reviewed and updated in EPIC     ------------------------------------------------------------------------------     PHYSICAL EXAM:   Vitals:    03/24/21 1610   BP: 126/86   Pulse: 87   Resp: 16   Temp: 36.8 °C (98.3 °F)   SpO2: 96%      Body mass index is 39.2 kg/m².         Constitutional: no acute distress  Neck: supple, no JVD  CV: heart RRR  Resp: normal effort, no wheezing or rales.  GI: abdomen soft, no obvious mass, no tenderness  Neuro: CN 2-12 grossly intact  Left Shoulder : no significant swelling redness or deformity.   ROM limited due to pain shubham w shoulder abduction, flexion and extension  Pos impingement sign. Neg thumb down abduction test      -----------------------------------------------------------------------------    ASSESSMENT:   1. BMI 39.0-39.9,adult     2. Hyperproteinemia     3. Gout, unspecified cause, unspecified chronicity, unspecified site     4. Low vitamin D level     5. Hyperlipidemia, unspecified hyperlipidemia type     6. Acute pain of left shoulder  DX-SHOULDER 2+ LEFT    REFERRAL TO PHYSICAL THERAPY           MEDICAL DECISION MAKING: DISCUSSION / STATUS / PLAN:    Advised the patient regarding diet and exercise.  The patient will try to lose some weight.  Advised the patient to avoid taking protein supplement.  Patient to repeat serum protein and SPEP in 4 weeks.  Continue with current medications.  Left shoulder x-ray ordered today.  Suspect the patient likely have rotator cuff tendinitis/bursitis  Refer the patient to physical therapy.  Follow-up if not better.     Return in about 6 months (around 9/24/2021).       PATIENT EDUCATION:  -If any problems should arise, patient was advised to contact our office or go to ER to be evaluated.      Please note that this dictation was created using voice recognition software. I have made every reasonable attempt to correct obvious  errors, but it is possible there are errors of grammar and possibly content that I did not discover before finalizing the note.

## 2021-03-24 NOTE — ASSESSMENT & PLAN NOTE
Chronic condition.  The patient is presently taking atorvastatin.  No significant side effects reported.    Lab test showED  Results for TAURAMIREZ WELLSIAMI (MRN [id number]) as of 3/24/2021 16:09   Ref. Range 3/3/2021 08:34   Cholesterol,Tot Latest Ref Range: 100 - 199 mg/dL 200 (H)   Triglycerides Latest Ref Range: 0 - 149 mg/dL 124   HDL Latest Ref Range: >=40 mg/dL 50   LDL Latest Ref Range: <100 mg/dL 125 (H)

## 2021-03-24 NOTE — ASSESSMENT & PLAN NOTE
This is chronic condition.   Pt is aware of elevated BMI.  Brief discussion with the patient regarding diet, exercise, and lifestyle modification to help achieve and maintain healthy weight

## 2021-03-24 NOTE — ASSESSMENT & PLAN NOTE
This is a new condition noted since last 3 weeks.  Patient denies any recent trauma or injury.  No fever or chills.  Pain is worse with shoulder movement.  Severity mild to moderate.  Patient denies any chest pain shortness of breath.

## 2021-03-24 NOTE — ASSESSMENT & PLAN NOTE
Recent blood test showed low vitamin D level.  Advised the patient to start taking vitamin D 50,000 unit once a week.

## 2021-03-24 NOTE — ASSESSMENT & PLAN NOTE
Recent lab test show elevated protein level at 8.3.  Patient is asymptomatic.  We have ordered to repeat routine and serum protein electrophoresis

## 2021-04-22 ENCOUNTER — HOSPITAL ENCOUNTER (OUTPATIENT)
Dept: RADIOLOGY | Facility: MEDICAL CENTER | Age: 41
End: 2021-04-22
Attending: INTERNAL MEDICINE
Payer: COMMERCIAL

## 2021-04-22 DIAGNOSIS — M25.512 ACUTE PAIN OF LEFT SHOULDER: ICD-10-CM

## 2021-04-22 PROCEDURE — 73030 X-RAY EXAM OF SHOULDER: CPT | Mod: LT

## 2021-04-30 ENCOUNTER — PHYSICAL THERAPY (OUTPATIENT)
Dept: PHYSICAL THERAPY | Facility: REHABILITATION | Age: 41
End: 2021-04-30
Attending: INTERNAL MEDICINE
Payer: COMMERCIAL

## 2021-04-30 DIAGNOSIS — M25.512 ACUTE PAIN OF LEFT SHOULDER: ICD-10-CM

## 2021-04-30 PROCEDURE — 97110 THERAPEUTIC EXERCISES: CPT

## 2021-04-30 PROCEDURE — 97161 PT EVAL LOW COMPLEX 20 MIN: CPT

## 2021-04-30 PROCEDURE — 97014 ELECTRIC STIMULATION THERAPY: CPT

## 2021-04-30 ASSESSMENT — ENCOUNTER SYMPTOMS
PAIN SCALE: 0
PAIN SCALE AT LOWEST: 0
PAIN SCALE AT HIGHEST: 2

## 2021-04-30 NOTE — OP THERAPY EVALUATION
Outpatient Physical Therapy  INITIAL EVALUATION    [facility] Physical Therapy [address]  [address]  [place] [address]  Phone:  [phone]  Fax:  [phone]    Date of Evaluation: 2021    Patient: Ryan Lora  YOB: 1980  MRN: [id number]     Referring Provider: Jairon Mccoy M.D.  [address] [place] [id number]   Referring Diagnosis Acute pain of left shoulder [M25.512]     Time Calculation    Start time: 1050  Stop time: 1150 Time Calculation (min): 60 minutes             Chief Complaint: No chief complaint on file.    Visit Diagnoses     ICD-10-CM   1. Acute pain of left shoulder  M25.512       No data found  Subjective   History of Present Illness:     History of chief complaint:  Patient reports a progressive shoulder pain while working out w/o limits during ADLs--patient denies specific . --saw chiropractic with treatment to neck/shoulder w/o relief of shoulder pain. 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 , low rep focus    Pain:     Current pain ratin    At best pain ratin    At worst pain ratin    Location:  Anteral and lateral ACJ deep a    Aggravating factors:  Sleep w/o limit  Pain with lifting more than 20lbs above shoulder  Bench press, incline, flies and upright-front rows are the worse          Past Medical History:   Diagnosis Date   • Gout      Past Surgical History:   Procedure Laterality Date   • ARTHROSCOPY, KNEE Right     acl, mcl, meniscus repair       Precautions:       Objective   Observation and functional movement:  Bilateral moderate forward scap positioning    Range of motion and strength:    Shoulder ROM:   AROM: L flexion:  180 Abduction: 180 HBB:t8  ER:60--erp front of shoulder  AROM: R flexion:  Abduction:  HBB:t10  ER:75  Resisted cuff tests:  IR:     R: strong                   L: strong,   ER:        R: strong mild               L:strong mild  empty can:  R:  -                    L+  Full can R:  L:+  drop arm:   R: strong/no pain                L: moderate  impingement sign:   L: +  (+) infraspinatus lag test and end range with 90 abd    Palpation and joint mobility:     TTP infraspinatus--reproduced          Exercises/Treatment  Time-based treatments/modalities:    Physical Therapy Timed Treatment Charges  Manual therapy minutes (CPT 19652): 5 minutes  Therapeutic exercise minutes (CPT 29889): 10 minutes      Assessment, Response and Plan:   Assessment details:  Patient presents with L infraspinatus strain .--resisted ER and empty/full can were  painful but strong. Reprodueced patient c/owith palpation to infraspinatus. Increased strength and rom after treatment. Patient should progress well for goals if consistent with HEP/POC  Prognosis: good    Goals:   Short Term Goals:   Lift 15lbs above head w/o pain  10 push ups w/o pain  Bench press > 140 lbbs x 10 w/o pain  DASH< 10%  Short term goal time span:  2-4 weeks      Long Term Goals:    Lift 30 lbs above head w/o pain  20 push ups w/o pain  Bench press > 175lbs  x 10 w/o pain  DASH< 5%  Long term goal time span:  6-8 weeks    Plan:   Therapy options:  Physical therapy treatment to continue  Planned therapy interventions:  Manual Therapy (CPT 99279), Therapeutic Exercise (CPT 32049) and E Stim Unattended (CPT 30394)  Other planned therapy interventions:  Dry needle  Frequency:  2x week  Duration in weeks:  8  Duration in visits:  12  Plan details:  Scap stab--everett progression , DN, IASTM, cupping es-divine, jt mobs      Functional Assessment Used  PT Functional Assessment Tool Used: DASH  PT Functional Assessment Score: 20%     Referring provider co-signature:  I have reviewed this plan of care and my co-signature certifies the need for services.    Certification Period: 04/30/2021 to  07/02/21    Physician  Signature: ________________________________ Date: ______________         [Abdominal Pain] : abdominal pain [Pelvic Pain] : pelvic pain [Nl] : Musculoskeletal

## 2021-05-06 ENCOUNTER — PHYSICAL THERAPY (OUTPATIENT)
Dept: PHYSICAL THERAPY | Facility: REHABILITATION | Age: 41
End: 2021-05-06
Attending: INTERNAL MEDICINE
Payer: COMMERCIAL

## 2021-05-06 DIAGNOSIS — M25.512 ACUTE PAIN OF LEFT SHOULDER: ICD-10-CM

## 2021-05-06 PROCEDURE — 97140 MANUAL THERAPY 1/> REGIONS: CPT

## 2021-05-06 PROCEDURE — 97014 ELECTRIC STIMULATION THERAPY: CPT

## 2021-05-06 PROCEDURE — 97110 THERAPEUTIC EXERCISES: CPT

## 2021-05-06 NOTE — OP THERAPY DAILY TREATMENT
Outpatient Physical Therapy  DAILY TREATMENT     [facility] Physical Therapy [address].  [address]  [place] [address]  Phone:  [phone]  Fax:  [phone]    Date: 05/06/2021    Patient: Ryan Lora  YOB: 1980  MRN: [id number]     Time Calculation    Start time: 1020  Stop time: 1110 Time Calculation (min): 50 minutes         Chief Complaint: No chief complaint on file.    Visit #: 2    SUBJECTIVE:  Patient reports that he is still having nighttime pain and has not worked out    OBJECTIVE:  Resisted ER--weak painfull          Therapeutic Treatments and Modalities:     Therapeutic Treatment and Modalities Summary: Prone angels x 10 2# x 10--hep  Walk back angels--#3 band --hep  DN: Patient signed informed written release and verbally agreed with informed consent to procedure of dry needling   skin prep with isopropyl  Alcohol/Chlora prep  -L infra nd ant.lat deltoid  -TENS w/ FDN  Walk back angels #3 to fatigue  -MHP x 10'  -No adverse reactions observed post treatment    Time-based treatments/modalities:    Physical Therapy Timed Treatment Charges  Manual therapy minutes (CPT 39551): 10 minutes  Therapeutic exercise minutes (CPT 17869): 15 minutes      Pain rating (1-10) before treatment:  0  Pain rating (1-10) after treatment:  0    ASSESSMENT:   Cont. Weak and painful with resitsed ER and pain at night.  Patient tolerated ex w/o pain and reported increased strength after treatment.  Patient reported reproduction of ant shoulder pain with needling to infraspinatus belly    PLAN/RECOMMENDATIONS:   Hubert progress, rolling progression, lumbar lock thorax rotation

## 2021-05-14 ENCOUNTER — PHYSICAL THERAPY (OUTPATIENT)
Dept: PHYSICAL THERAPY | Facility: REHABILITATION | Age: 41
End: 2021-05-14
Attending: INTERNAL MEDICINE
Payer: COMMERCIAL

## 2021-05-14 DIAGNOSIS — M25.512 ACUTE PAIN OF LEFT SHOULDER: ICD-10-CM

## 2021-05-14 PROCEDURE — 97014 ELECTRIC STIMULATION THERAPY: CPT

## 2021-05-14 PROCEDURE — 97140 MANUAL THERAPY 1/> REGIONS: CPT

## 2021-05-14 PROCEDURE — 97110 THERAPEUTIC EXERCISES: CPT

## 2021-05-14 NOTE — OP THERAPY DAILY TREATMENT
Outpatient Physical Therapy  DAILY TREATMENT     [facility] Physical Therapy [address]  [address]  [place] [address]  Phone:  [phone]  Fax:  [phone]    Date: 05/14/2021    Patient: Ryan Lora  YOB: 1980  MRN: [id number]     Time Calculation    Start time: 1015  Stop time: 1100 Time Calculation (min): 45 minutes         Chief Complaint: No chief complaint on file.    Visit #: 3    SUBJECTIVE:  Feels a little better and still some soreness upon waking.  Patient feels that he is getting a little stronger  OBJECTIVE:  Resisted ER and empty: slight pain but strong--full can and Ir strong no pain          Therapeutic Treatments and Modalities:     Therapeutic Treatment and Modalities Summary: Prone angels --reviewed  Walk back angels--reviewed  Box #2 supine wall and sitting  DN: Patient signed informed written release and verbally agreed with informed consent to procedure of dry needling   skin prep with isopropyl  Alcohol  -L infra nd ant.lat deltoid  -TENS  /w fdn  -MHP x 10'  -No adverse reactions observed post treatment    Time-based treatments/modalities:    Physical Therapy Timed Treatment Charges  Manual therapy minutes (CPT 16507): 10 minutes  Therapeutic exercise minutes (CPT 09296): 15 minutes      Pain rating (1-10) before treatment:  0  Pain rating (1-10) after treatment:  0    ASSESSMENT:   Improving strength and decreasing pain with resisted tests    PLAN/RECOMMENDATIONS: patient out of town for a week--Hubert progress, rolling progression, lumbar lock thorax rotation

## 2021-05-20 ENCOUNTER — APPOINTMENT (OUTPATIENT)
Dept: PHYSICAL THERAPY | Facility: REHABILITATION | Age: 41
End: 2021-05-20
Attending: INTERNAL MEDICINE
Payer: COMMERCIAL

## 2021-05-28 ENCOUNTER — APPOINTMENT (OUTPATIENT)
Dept: PHYSICAL THERAPY | Facility: REHABILITATION | Age: 41
End: 2021-05-28
Attending: INTERNAL MEDICINE
Payer: COMMERCIAL

## 2021-06-10 ENCOUNTER — PHYSICAL THERAPY (OUTPATIENT)
Dept: PHYSICAL THERAPY | Facility: REHABILITATION | Age: 41
End: 2021-06-10
Attending: INTERNAL MEDICINE
Payer: COMMERCIAL

## 2021-06-10 DIAGNOSIS — M25.512 ACUTE PAIN OF LEFT SHOULDER: ICD-10-CM

## 2021-06-10 PROCEDURE — 97014 ELECTRIC STIMULATION THERAPY: CPT

## 2021-06-10 PROCEDURE — 97110 THERAPEUTIC EXERCISES: CPT

## 2021-06-10 PROCEDURE — 97140 MANUAL THERAPY 1/> REGIONS: CPT

## 2021-06-10 NOTE — OP THERAPY DAILY TREATMENT
"  Outpatient Physical Therapy  DAILY TREATMENT     [facility] Physical Therapy [address].  [address]  [place] [address]  Phone:  [phone]  Fax:  [phone]    Date: 06/10/2021    Patient: Ryan Lora  YOB: 1980  MRN: [id number]     Time Calculation    Start time: 0215  Stop time: 0315 Time Calculation (min): 60 minutes         Chief Complaint: No chief complaint on file.    Visit #: 4    SUBJE CTIVE:  Doing ok, still having pain at night. Tried some weight 135  with some pain.  Fair hep compliance due to family emergency  OBJECTIVE:  Resisted ER and empty: slight pain weak ER -        Therapeutic Treatments and Modalities:     Therapeutic Treatment and Modalities Summary: S/l gh flexion with #3 with cups to infra spinatus--to fatigue  DN: Patient signed informed written release and verbally agreed with informed consent to procedure of dry needling   skin prep with isopropyl  Alcohol  -L infra and ant.lat deltoid  -TENS  W/  fdn  -MHP x 10'  -No adverse reactions observed post treatment    Reviewed importance of HEP --every hour 1-2x, for 30\"-1'    Time-based treatments/modalities:    Physical Therapy Timed Treatment Charges  Manual therapy minutes (CPT 77954): 15 minutes  Therapeutic exercise minutes (CPT 71651): 15 minutes      Pain rating (1-10) before treatment:  0  Pain rating (1-10) after treatment:  0    ASSESSMENT:   Limited HEP due to family emergency--noted weakness and difficulty with edu ex and weak ER strength with gh flexion > 90 -- cont. To suspect small RTC tear    PLAN/RECOMMENDATIONS: patient out of town for a week--Hubert progress, rolling progression, lumbar lock thorax rotation       "

## 2021-06-17 ENCOUNTER — PHYSICAL THERAPY (OUTPATIENT)
Dept: PHYSICAL THERAPY | Facility: REHABILITATION | Age: 41
End: 2021-06-17
Attending: INTERNAL MEDICINE
Payer: COMMERCIAL

## 2021-06-17 DIAGNOSIS — M25.512 ACUTE PAIN OF LEFT SHOULDER: ICD-10-CM

## 2021-06-17 PROCEDURE — 97110 THERAPEUTIC EXERCISES: CPT

## 2021-06-17 PROCEDURE — 97140 MANUAL THERAPY 1/> REGIONS: CPT

## 2021-06-17 PROCEDURE — 97014 ELECTRIC STIMULATION THERAPY: CPT

## 2021-06-17 NOTE — OP THERAPY DAILY TREATMENT
"  Outpatient Physical Therapy  DAILY TREATMENT     [facility] Physical Therapy [address]  [address]  [place] [address]  Phone:  [phone]  Fax:  [phone]    Date: 06/17/2021    Patient: Ryan Lora  YOB: 1980  MRN: [id number]     Time Calculation    Start time: 0718  Stop time: 0808 Time Calculation (min): 50 minutes         Chief Complaint: No chief complaint on file.    Visit #: 5    SUBJE CTIVE:  Patient reporting more consistency with his hep and able to workout a little more with some soreness but improving  OBJECTIVE:  Resisted ER and empty: slight pain improving strength w/ER -        Therapeutic Treatments and Modalities:     Therapeutic Treatment and Modalities Summary: ELDOA T6 in siting  Prone angles #4 lbs x 10  Reviewed HEP  DN: Patient signed informed written release and verbally agreed with informed consent to procedure of dry needling   skin prep with isopropyl  Alcohol  -L infra and post. delt  -TENS  W/  fdn  -MHP x 10'  -No adverse reactions observed post treatment    Reviewed importance of HEP --every hour 1-2x, for 30\"-1'    Time-based treatments/modalities:    Physical Therapy Timed Treatment Charges  Manual therapy minutes (CPT 76548): 10 minutes  Therapeutic exercise minutes (CPT 42836): 15 minutes      Pain rating (1-10) before treatment:  0  Pain rating (1-10) after treatment:  0    ASSESSMENT:   Improved compliance with hep and improved strength with decreasing pain--cont. To reproduce ant. shoulder pain with DN to infraspinatus mid-lateral belly    PLAN/RECOMMENDATIONS: patient out of town for a week--Hubert progress, rolling progression, lumbar lock thorax rotation       "

## 2021-06-25 ENCOUNTER — PHYSICAL THERAPY (OUTPATIENT)
Dept: PHYSICAL THERAPY | Facility: REHABILITATION | Age: 41
End: 2021-06-25
Attending: INTERNAL MEDICINE
Payer: COMMERCIAL

## 2021-06-25 DIAGNOSIS — M25.512 ACUTE PAIN OF LEFT SHOULDER: ICD-10-CM

## 2021-06-25 PROCEDURE — 97014 ELECTRIC STIMULATION THERAPY: CPT

## 2021-06-25 PROCEDURE — 97110 THERAPEUTIC EXERCISES: CPT

## 2021-06-25 PROCEDURE — 97140 MANUAL THERAPY 1/> REGIONS: CPT

## 2021-06-25 NOTE — OP THERAPY DAILY TREATMENT
"  Outpatient Physical Therapy  DAILY TREATMENT     [facility] Physical Therapy [address].  [address]  [place] [address]  Phone:  [phone]  Fax:  [phone]    Date: 06/25/2021    Patient: Ryan Lora  YOB: 1980  MRN: [id number]     Time Calculation    Start time: 0815  Stop time: 0900 Time Calculation (min): 45 minutes         Chief Complaint: No chief complaint on file.    Visit #: 6    SUBJE CTIVE:  Patient reporting more consistency with his hep and able to workout a little more with some soreness but improving  OBJECTIVE:  Resisted ER and empty: slight pain  Still weaker than R     Therapeutic Treatments and Modalities:     Therapeutic Treatment and Modalities Summary: wall box #3  Cable #20 lbs walk back angels//too heavy for L    crossed legged  ELDOA T6    Reviewed HEP  DN: Patient signed informed written release and verbally agreed with informed consent to procedure of dry needling   skin prep with isopropyl  Alcohol  -L infra and post. delt  -TENS  W/  fdn  -MHP x 10'  -No adverse reactions observed post treatment    Reviewed importance of HEP --every hour 1-2x, for 30\"-1'    Time-based treatments/modalities:    Physical Therapy Timed Treatment Charges  Manual therapy minutes (CPT 57458): 16 minutes  Therapeutic exercise minutes (CPT 43098): 10 minutes      Pain rating (1-10) before treatment:  0  Pain rating (1-10) after treatment:  0    ASSESSMENT:    improved strength with decreasing pain--cont. To reproduce ant. shoulder pain with DN to infraspinatus mid-lateral belly and resisted ER    PLAN/RECOMMENDATIONS: patient out of town for a week--Hubert progress, rolling progression, lumbar lock thorax rotation       "

## 2021-07-01 ENCOUNTER — PHYSICAL THERAPY (OUTPATIENT)
Dept: PHYSICAL THERAPY | Facility: REHABILITATION | Age: 41
End: 2021-07-01
Attending: INTERNAL MEDICINE
Payer: COMMERCIAL

## 2021-07-01 DIAGNOSIS — M25.512 ACUTE PAIN OF LEFT SHOULDER: ICD-10-CM

## 2021-07-01 PROCEDURE — 97140 MANUAL THERAPY 1/> REGIONS: CPT

## 2021-07-01 PROCEDURE — 97014 ELECTRIC STIMULATION THERAPY: CPT

## 2021-07-01 NOTE — OP THERAPY DAILY TREATMENT
Outpatient Physical Therapy  DAILY TREATMENT     [facility] Physical Therapy [address]  [address]  [place] [address]  Phone:  [phone]  Fax:  [phone]    Date: 07/01/2021    Patient: Ryan Lora  YOB: 1980  MRN: [id number]     Time Calculation    Start time: 0215  Stop time: 0305 Time Calculation (min): 50 minutes         Chief Complaint: No chief complaint on file.    Visit #: 7    SUBJE CTIVE:  Feeling good and able to workout w/o much pain  OBJECTIVE:  Resisted ER and empty: slight pain--stronger than last week--all resisted cuff test strong, no pain except  ER    Therapeutic Treatments and Modalities:     Therapeutic Treatment and Modalities Summary: Reviewed HEP    Reviewed HEP  DN: Patient signed informed written release and verbally agreed with informed consent to procedure of dry needling   skin prep with isopropyl  Alcohol  -L infra --needle manipulation lower infra belly  -TENS  W/  fdn  -MHP x 10'  -No adverse reactions observed post treatment      Time-based treatments/modalities:    Physical Therapy Timed Treatment Charges  Manual therapy minutes (CPT 34899): 23 minutes      Pain rating (1-10) before treatment:  0  Pain rating (1-10) after treatment:  0    ASSESSMENT:    improved strength with decreasing pain but still slight pain and weakness with resisted ER at 0 deg   PLAN/RECOMMENDATIONS: DN d/c 1 visit

## 2021-07-07 ENCOUNTER — OFFICE VISIT (OUTPATIENT)
Dept: OCCUPATIONAL MEDICINE | Facility: CLINIC | Age: 41
End: 2021-07-07

## 2021-07-07 ENCOUNTER — NON-PROVIDER VISIT (OUTPATIENT)
Dept: OCCUPATIONAL MEDICINE | Facility: CLINIC | Age: 41
End: 2021-07-07

## 2021-07-07 ENCOUNTER — PHYSICAL THERAPY (OUTPATIENT)
Dept: PHYSICAL THERAPY | Facility: REHABILITATION | Age: 41
End: 2021-07-07
Attending: INTERNAL MEDICINE
Payer: COMMERCIAL

## 2021-07-07 DIAGNOSIS — M25.512 ACUTE PAIN OF LEFT SHOULDER: ICD-10-CM

## 2021-07-07 DIAGNOSIS — Z02.1 PRE-EMPLOYMENT HEALTH SCREENING EXAMINATION: ICD-10-CM

## 2021-07-07 PROCEDURE — 8915 PR COMPREHENSIVE PHYSICAL: Performed by: NURSE PRACTITIONER

## 2021-07-07 PROCEDURE — 97110 THERAPEUTIC EXERCISES: CPT

## 2021-07-07 NOTE — OP THERAPY DAILY TREATMENT
Outpatient Physical Therapy  DAILY TREATMENT     [facility] Physical Therapy [address]  [address]  [place] [address]  Phone:  [phone]  Fax:  [phone]    Date: 07/07/2021    Patient: Ryan Lora  YOB: 1980  MRN: [id number]     Time Calculation    Start time: 0416  Stop time: 0435 Time Calculation (min): 19 minutes         Chief Complaint: No chief complaint on file.    Visit #: 8    SUBJE CTIVE:  Feeling good and able to workout w/o pain  OBJECTIVE:  Resisted cuff tests: no pain--stronger than last week--all resisted cuff test strong, no pain     Therapeutic Treatments and Modalities:     Therapeutic Treatment and Modalities Summary: 4lbs pillow case ER with body rotation  Wall angels #2 3 x 10          Time-based treatments/modalities:    Physical Therapy Timed Treatment Charges  Therapeutic exercise minutes (CPT 26107): 15 minutes      Pain rating (1-10) before treatment:  0  Pain rating (1-10) after treatment:  0    ASSESSMENT:    improved strength with patient denying pain with ADLs or weight lifting--cont. To present with slight weakness L vs R shoulder  PLAN/RECOMMENDATIONS:  d/c

## 2021-07-07 NOTE — OP THERAPY DISCHARGE SUMMARY
Outpatient Physical Therapy  DISCHARGE SUMMARY NOTE      [facility] Physical Therapy [address].  [address]  [place] [address]  Phone:  [phone]  Fax:  [phone]    Date of Visit: 07/07/2021    Patient: Ryan Lora  YOB: 1980  MRN: [id number]     Referring Provider: No referring provider defined for this encounter.   Referring Diagnosis Acute pain of left shoulder [M25.512]         Functional Assessment Used        Your patient is being discharged from Physical Therapy with the following comments:   · Goals met    Patient reported that he is feeling good and able to workout w/o pain.  OBJECTIVE:  Resisted cuff tests: no pain--stronger than last week--all resisted cuff test strong, no pain       ASSESSMENT:    improved strength with patient denying pain with ADLs or weight lifting--cont. To present with slight weakness L vs R shoulder  PLAN/RECOMMENDATIONS:  D/c to an indep. HEP    Vinay Prince, PT, DPT, OCS    Date: 7/7/2021

## 2021-12-01 DIAGNOSIS — E78.5 HYPERLIPIDEMIA, UNSPECIFIED HYPERLIPIDEMIA TYPE: ICD-10-CM

## 2021-12-01 RX ORDER — ATORVASTATIN CALCIUM 20 MG/1
TABLET, FILM COATED ORAL
Qty: 90 TABLET | Refills: 0 | Status: SHIPPED | OUTPATIENT
Start: 2021-12-01 | End: 2022-03-21 | Stop reason: SDUPTHER

## 2021-12-01 RX ORDER — LOSARTAN POTASSIUM 50 MG/1
50 TABLET ORAL DAILY
Qty: 90 TABLET | Refills: 0 | Status: SHIPPED | OUTPATIENT
Start: 2021-12-01 | End: 2022-04-21 | Stop reason: SDUPTHER

## 2021-12-01 RX ORDER — FEBUXOSTAT 80 MG/1
80 TABLET, FILM COATED ORAL DAILY
Qty: 90 TABLET | Refills: 0 | Status: SHIPPED | OUTPATIENT
Start: 2021-12-01 | End: 2022-03-22 | Stop reason: SDUPTHER

## 2022-01-25 ENCOUNTER — NON-PROVIDER VISIT (OUTPATIENT)
Dept: OCCUPATIONAL MEDICINE | Facility: CLINIC | Age: 42
End: 2022-01-25

## 2022-01-25 DIAGNOSIS — Z23 NEED FOR VACCINATION: ICD-10-CM

## 2022-01-25 PROCEDURE — 90746 HEPB VACCINE 3 DOSE ADULT IM: CPT | Performed by: PREVENTIVE MEDICINE

## 2022-02-25 ENCOUNTER — EH NON-PROVIDER (OUTPATIENT)
Dept: OCCUPATIONAL MEDICINE | Facility: CLINIC | Age: 42
End: 2022-02-25

## 2022-02-25 DIAGNOSIS — Z23 NEED FOR VACCINATION: Primary | ICD-10-CM

## 2022-02-25 PROCEDURE — 90746 HEPB VACCINE 3 DOSE ADULT IM: CPT | Performed by: NURSE PRACTITIONER

## 2022-03-21 DIAGNOSIS — Z00.00 WELL ADULT EXAM: ICD-10-CM

## 2022-03-21 DIAGNOSIS — E78.5 HYPERLIPIDEMIA, UNSPECIFIED HYPERLIPIDEMIA TYPE: ICD-10-CM

## 2022-03-21 RX ORDER — ATORVASTATIN CALCIUM 20 MG/1
TABLET, FILM COATED ORAL
Qty: 90 TABLET | Refills: 3 | Status: SHIPPED | OUTPATIENT
Start: 2022-03-21 | End: 2023-02-28

## 2022-03-22 RX ORDER — FEBUXOSTAT 80 MG/1
80 TABLET, FILM COATED ORAL DAILY
Qty: 90 TABLET | Refills: 0 | Status: SHIPPED | OUTPATIENT
Start: 2022-03-22 | End: 2022-06-02

## 2022-03-24 ENCOUNTER — HOSPITAL ENCOUNTER (OUTPATIENT)
Dept: LAB | Facility: MEDICAL CENTER | Age: 42
End: 2022-03-24
Attending: INTERNAL MEDICINE
Payer: COMMERCIAL

## 2022-03-24 DIAGNOSIS — Z00.00 WELL ADULT EXAM: ICD-10-CM

## 2022-03-24 LAB
25(OH)D3 SERPL-MCNC: 18 NG/ML (ref 30–100)
ALT SERPL-CCNC: 46 U/L (ref 2–50)
ANION GAP SERPL CALC-SCNC: 10 MMOL/L (ref 7–16)
BUN SERPL-MCNC: 9 MG/DL (ref 8–22)
CALCIUM SERPL-MCNC: 9.7 MG/DL (ref 8.5–10.5)
CHLORIDE SERPL-SCNC: 103 MMOL/L (ref 96–112)
CHOLEST SERPL-MCNC: 183 MG/DL (ref 100–199)
CO2 SERPL-SCNC: 28 MMOL/L (ref 20–33)
CREAT SERPL-MCNC: 1 MG/DL (ref 0.5–1.4)
CREAT UR-MCNC: 305.72 MG/DL
ERYTHROCYTE [DISTWIDTH] IN BLOOD BY AUTOMATED COUNT: 45 FL (ref 35.9–50)
EST. AVERAGE GLUCOSE BLD GHB EST-MCNC: 137 MG/DL
GFR SERPLBLD CREATININE-BSD FMLA CKD-EPI: 97 ML/MIN/1.73 M 2
GLUCOSE SERPL-MCNC: 113 MG/DL (ref 65–99)
HBA1C MFR BLD: 6.4 % (ref 4–5.6)
HCT VFR BLD AUTO: 53.5 % (ref 42–52)
HDLC SERPL-MCNC: 47 MG/DL
HGB BLD-MCNC: 17.2 G/DL (ref 14–18)
LDLC SERPL CALC-MCNC: 105 MG/DL
MCH RBC QN AUTO: 30.1 PG (ref 27–33)
MCHC RBC AUTO-ENTMCNC: 32.1 G/DL (ref 33.7–35.3)
MCV RBC AUTO: 93.7 FL (ref 81.4–97.8)
MICROALBUMIN UR-MCNC: 1.3 MG/DL
MICROALBUMIN/CREAT UR: 4 MG/G (ref 0–30)
PLATELET # BLD AUTO: 268 K/UL (ref 164–446)
PMV BLD AUTO: 9.9 FL (ref 9–12.9)
POTASSIUM SERPL-SCNC: 4.9 MMOL/L (ref 3.6–5.5)
RBC # BLD AUTO: 5.71 M/UL (ref 4.7–6.1)
SODIUM SERPL-SCNC: 141 MMOL/L (ref 135–145)
TRIGL SERPL-MCNC: 155 MG/DL (ref 0–149)
TSH SERPL DL<=0.005 MIU/L-ACNC: 1.77 UIU/ML (ref 0.38–5.33)
URATE SERPL-MCNC: 5.2 MG/DL (ref 2.5–8.3)
WBC # BLD AUTO: 4.8 K/UL (ref 4.8–10.8)

## 2022-03-24 PROCEDURE — 36415 COLL VENOUS BLD VENIPUNCTURE: CPT

## 2022-03-24 PROCEDURE — 84460 ALANINE AMINO (ALT) (SGPT): CPT

## 2022-03-24 PROCEDURE — 80048 BASIC METABOLIC PNL TOTAL CA: CPT

## 2022-03-24 PROCEDURE — 84443 ASSAY THYROID STIM HORMONE: CPT

## 2022-03-24 PROCEDURE — 80061 LIPID PANEL: CPT

## 2022-03-24 PROCEDURE — 82570 ASSAY OF URINE CREATININE: CPT

## 2022-03-24 PROCEDURE — 83036 HEMOGLOBIN GLYCOSYLATED A1C: CPT

## 2022-03-24 PROCEDURE — 85027 COMPLETE CBC AUTOMATED: CPT

## 2022-03-24 PROCEDURE — 84550 ASSAY OF BLOOD/URIC ACID: CPT

## 2022-03-24 PROCEDURE — 82306 VITAMIN D 25 HYDROXY: CPT

## 2022-03-24 PROCEDURE — 82043 UR ALBUMIN QUANTITATIVE: CPT

## 2022-04-20 ENCOUNTER — OFFICE VISIT (OUTPATIENT)
Dept: MEDICAL GROUP | Facility: PHYSICIAN GROUP | Age: 42
End: 2022-04-20
Payer: COMMERCIAL

## 2022-04-20 VITALS
SYSTOLIC BLOOD PRESSURE: 132 MMHG | WEIGHT: 293.56 LBS | OXYGEN SATURATION: 96 % | HEART RATE: 77 BPM | RESPIRATION RATE: 18 BRPM | HEIGHT: 72 IN | TEMPERATURE: 97.9 F | DIASTOLIC BLOOD PRESSURE: 86 MMHG | BODY MASS INDEX: 39.76 KG/M2

## 2022-04-20 DIAGNOSIS — R79.89 LOW VITAMIN D LEVEL: ICD-10-CM

## 2022-04-20 DIAGNOSIS — M10.9 GOUT, UNSPECIFIED CAUSE, UNSPECIFIED CHRONICITY, UNSPECIFIED SITE: Chronic | ICD-10-CM

## 2022-04-20 DIAGNOSIS — R73.03 PREDIABETES: Chronic | ICD-10-CM

## 2022-04-20 DIAGNOSIS — E78.5 HYPERLIPIDEMIA, UNSPECIFIED HYPERLIPIDEMIA TYPE: Chronic | ICD-10-CM

## 2022-04-20 DIAGNOSIS — E88.09 HYPERPROTEINEMIA: ICD-10-CM

## 2022-04-20 PROCEDURE — 99396 PREV VISIT EST AGE 40-64: CPT | Performed by: INTERNAL MEDICINE

## 2022-04-20 ASSESSMENT — FIBROSIS 4 INDEX: FIB4 SCORE: 0.74

## 2022-04-20 ASSESSMENT — PATIENT HEALTH QUESTIONNAIRE - PHQ9: CLINICAL INTERPRETATION OF PHQ2 SCORE: 0

## 2022-04-20 NOTE — ASSESSMENT & PLAN NOTE
This is a chronic condition.  The patient is presently taking Uloric.  Patient is currently asymptomatic.  No significant side effects reported.

## 2022-04-20 NOTE — PROGRESS NOTES
PRIMARY CARE CLINIC VISIT  Chief Complaint   Patient presents with   • Annual Exam         History of Present Illness     Gout  This is a chronic condition.  The patient is presently taking Uloric.  Patient is currently asymptomatic.  No significant side effects reported.    Hyperlipidemia  Chronic condition.  The patient is being treated with atorvastatin.    Hyperproteinemia  Previous lab test show slightly elevated protein level.  Patient asymptomatic.  Recommend lab test to be done for follow-up.    Low vitamin D level  Patient reported that he has discontinued taking the vitamin D 50,000 unit once a week.  Recent blood test showed low vitamin D level.  Patient recommended to take over-the-counter vitamin D3 4000 units daily.  Advised the patient to repeat the vitamin D lab test in approximately 2 months.    Prediabetes  Chronic condition.  The patient currently on diet therapy.  Recent A1c 6.4%.  Today I have a long discussed with the patient about starting the patient on metformin.  Potential side effect of medication discussed with the patient.  The patient stated that he wishes to think about it and discuss it over with his family before taking the medication.        Current Outpatient Medications on File Prior to Visit   Medication Sig Dispense Refill   • Cholecalciferol (VITAMIN D3) 50 MCG (2000 UT) Chew Tab Chew 4,000 Units every day.     • febuxostat (ULORIC) 80 MG Tab Take 1 Tablet by mouth every day. 90 Tablet 0   • atorvastatin (LIPITOR) 20 MG Tab TAKE 1 TABLET DAILY 90 Tablet 3   • losartan (COZAAR) 50 MG Tab Take 1 Tablet by mouth every day. 90 Tablet 0     No current facility-administered medications on file prior to visit.        Allergies: Patient has no known allergies.    ROS  As per HPI above. All other systems reviewed and negative.      Past Medical, Social, and Family history reviewed and updated in EPIC     Objective     /86 (BP Location: Right arm, Patient Position: Sitting, BP Cuff  Size: Adult long)   Pulse 77   Temp 36.6 °C (97.9 °F) (Temporal)   Resp 18   Ht 1.829 m (6')   Wt (!) 133 kg (293 lb 9 oz)   SpO2 96%    Body mass index is 39.81 kg/m².    General: alert and oriented  Cardiovascular: regular rate and rhythm  Pulmonary: lungs : no wheezing   Gastrointestinal: BS present. No obvious mass noted          Assessment and Plan     1. Low vitamin D level  Shared decision making.  Patient will start over-the-counter vitamin D 4000 units daily.  Repeat blood test in 2 months  - VITAMIN D,25 HYDROXY; Future    2. Gout, unspecified cause, unspecified chronicity, unspecified site  Chronic condition.  Patient asymptomatic.  Continue with Uloric    3. Hyperproteinemia  Blood tests ordered.  - TOTAL PROTEIN; Future  - SPEP W/REFLEX TO SANDRA, A, G, M; Future    4. Hyperlipidemia, unspecified hyperlipidemia type  Continue with atorvastatin.    5. Prediabetes  Advised the patient regarding diet and exercise.  Discussed with the patient regarding metformin treatment.  The patient stated that he would like to think about it.        Plan follow-up 4 months           Please note that this dictation was created using voice recognition software. I have made every reasonable attempt to correct obvious errors but there may be errors of grammar and content that I may have overlooked prior to finalization of this note.      Jairon Mccoy MD  Internal Medicine  [facility]

## 2022-04-20 NOTE — ASSESSMENT & PLAN NOTE
Chronic condition.  The patient currently on diet therapy.  Recent A1c 6.4%.  Today I have a long discussed with the patient about starting the patient on metformin.  Potential side effect of medication discussed with the patient.  The patient stated that he wishes to think about it and discuss it over with his family before taking the medication.

## 2022-04-20 NOTE — ASSESSMENT & PLAN NOTE
Previous lab test show slightly elevated protein level.  Patient asymptomatic.  Recommend lab test to be done for follow-up.

## 2022-04-20 NOTE — ASSESSMENT & PLAN NOTE
Patient reported that he has discontinued taking the vitamin D 50,000 unit once a week.  Recent blood test showed low vitamin D level.  Patient recommended to take over-the-counter vitamin D3 4000 units daily.  Advised the patient to repeat the vitamin D lab test in approximately 2 months.

## 2022-04-23 RX ORDER — LOSARTAN POTASSIUM 50 MG/1
50 TABLET ORAL DAILY
Qty: 90 TABLET | Refills: 0 | Status: SHIPPED | OUTPATIENT
Start: 2022-04-23 | End: 2022-07-05

## 2022-06-02 RX ORDER — FEBUXOSTAT 80 MG/1
TABLET, FILM COATED ORAL
Qty: 90 TABLET | Refills: 3 | Status: SHIPPED | OUTPATIENT
Start: 2022-06-02 | End: 2023-05-30

## 2022-06-02 NOTE — TELEPHONE ENCOUNTER
2Received request via: Pharmacy    Was the patient seen in the last year in this department? Yes    Does the patient have an active prescription (recently filled or refills available) for medication(s) requested? No

## 2022-07-05 RX ORDER — LOSARTAN POTASSIUM 50 MG/1
TABLET ORAL
Qty: 90 TABLET | Refills: 3 | Status: SHIPPED | OUTPATIENT
Start: 2022-07-05 | End: 2023-07-03

## 2022-07-29 ENCOUNTER — EH NON-PROVIDER (OUTPATIENT)
Dept: OCCUPATIONAL MEDICINE | Facility: CLINIC | Age: 42
End: 2022-07-29

## 2022-07-29 DIAGNOSIS — Z23 NEED FOR VACCINATION: ICD-10-CM

## 2022-07-29 PROCEDURE — 90746 HEPB VACCINE 3 DOSE ADULT IM: CPT | Performed by: NURSE PRACTITIONER

## 2023-02-26 DIAGNOSIS — E78.5 HYPERLIPIDEMIA, UNSPECIFIED HYPERLIPIDEMIA TYPE: ICD-10-CM

## 2023-02-28 ENCOUNTER — TELEPHONE (OUTPATIENT)
Dept: MEDICAL GROUP | Facility: PHYSICIAN GROUP | Age: 43
End: 2023-02-28
Payer: COMMERCIAL

## 2023-02-28 RX ORDER — ATORVASTATIN CALCIUM 20 MG/1
TABLET, FILM COATED ORAL
Qty: 90 TABLET | Refills: 0 | Status: SHIPPED | OUTPATIENT
Start: 2023-02-28 | End: 2023-05-30

## 2023-03-01 ENCOUNTER — TELEPHONE (OUTPATIENT)
Dept: MEDICAL GROUP | Facility: PHYSICIAN GROUP | Age: 43
End: 2023-03-01
Payer: COMMERCIAL

## 2023-03-01 NOTE — TELEPHONE ENCOUNTER
FINAL PRIOR AUTHORIZATION STATUS:    1.  Name of Medication & Dose: febuxostat 80mg tab     2. Prior Auth Status: Approved through 02/29/24     3. Action Taken: Pharmacy Notified: no Patient Notified: yes

## 2023-11-28 ENCOUNTER — OFFICE VISIT (OUTPATIENT)
Dept: MEDICAL GROUP | Facility: PHYSICIAN GROUP | Age: 43
End: 2023-11-28
Payer: COMMERCIAL

## 2023-11-28 VITALS
TEMPERATURE: 98.2 F | OXYGEN SATURATION: 97 % | DIASTOLIC BLOOD PRESSURE: 86 MMHG | HEART RATE: 75 BPM | HEIGHT: 72 IN | WEIGHT: 312.8 LBS | SYSTOLIC BLOOD PRESSURE: 124 MMHG | BODY MASS INDEX: 42.37 KG/M2

## 2023-11-28 DIAGNOSIS — R79.89 LOW VITAMIN D LEVEL: Chronic | ICD-10-CM

## 2023-11-28 DIAGNOSIS — R73.03 PREDIABETES: Chronic | ICD-10-CM

## 2023-11-28 DIAGNOSIS — Z00.00 ANNUAL PHYSICAL EXAM: ICD-10-CM

## 2023-11-28 DIAGNOSIS — I10 ESSENTIAL HYPERTENSION: ICD-10-CM

## 2023-11-28 DIAGNOSIS — E78.5 DYSLIPIDEMIA: Chronic | ICD-10-CM

## 2023-11-28 DIAGNOSIS — M10.9 GOUT, UNSPECIFIED CAUSE, UNSPECIFIED CHRONICITY, UNSPECIFIED SITE: Chronic | ICD-10-CM

## 2023-11-28 PROCEDURE — 3074F SYST BP LT 130 MM HG: CPT | Performed by: INTERNAL MEDICINE

## 2023-11-28 PROCEDURE — 99396 PREV VISIT EST AGE 40-64: CPT | Performed by: INTERNAL MEDICINE

## 2023-11-28 PROCEDURE — 3079F DIAST BP 80-89 MM HG: CPT | Performed by: INTERNAL MEDICINE

## 2023-11-28 RX ORDER — FEBUXOSTAT 80 MG/1
TABLET, FILM COATED ORAL
Qty: 90 TABLET | Refills: 3 | Status: SHIPPED | OUTPATIENT
Start: 2023-11-28

## 2023-11-28 RX ORDER — LOSARTAN POTASSIUM 50 MG/1
50 TABLET ORAL DAILY
Qty: 90 TABLET | Refills: 3 | Status: SHIPPED | OUTPATIENT
Start: 2023-11-28

## 2023-11-28 RX ORDER — ATORVASTATIN CALCIUM 20 MG/1
TABLET, FILM COATED ORAL
Qty: 90 TABLET | Refills: 3 | Status: SHIPPED | OUTPATIENT
Start: 2023-11-28 | End: 2024-02-08

## 2023-11-28 ASSESSMENT — PATIENT HEALTH QUESTIONNAIRE - PHQ9: CLINICAL INTERPRETATION OF PHQ2 SCORE: 0

## 2023-11-28 NOTE — ASSESSMENT & PLAN NOTE
Chronic condition.  The patient taking atorvastatin 20 Mg daily.  The patient is due for lab test.

## 2023-11-28 NOTE — PROGRESS NOTES
PRIMARY CARE CLINIC VISIT    Chief complaint:    Pt is here for Annual Exam      History of Present Illness     BMI 39.0-39.9,adult  Chronic condition.  Discussed with the patient regarding diet, exercise, and lifestyle modification to help achieve and maintain healthy weight         Gout  Chronic ongoing condition for the patient is taking Uloric 80 Mg daily.  The patient denied recent gout flareup.  Patient tolerated medication well.    Dyslipidemia  Chronic condition.  The patient taking atorvastatin 20 Mg daily.  The patient is due for lab test.    Low vitamin D level  Chronic condition.  The patient is taking vitamin D3 4000 unit daily.  Lab test ordered for follow-up.    Prediabetes  Chronic condition for the patient currently on diet therapy.  Patient is due for blood test.    Essential hypertension  This is a chronic condition.  The patient is taking losartan 50 Mg daily.  Blood pressure has been well-controlled.  Patient request Rx refills      Allergies: Patient has no known allergies.    Current Outpatient Medications Ordered in Epic   Medication Sig Dispense Refill    atorvastatin (LIPITOR) 20 MG Tab Take 1 tab daily 90 Tablet 3    febuxostat (ULORIC) 80 MG Tab TAKE 1 TABLET DAILY 90 Tablet 3    losartan (COZAAR) 50 MG Tab Take 1 Tablet by mouth every day. 90 Tablet 3    Cholecalciferol (VITAMIN D3) 50 MCG (2000 UT) Chew Tab Chew 4,000 Units every day.       No current [facility]-ordered facility-administered medications on file.       Past Medical History:   Diagnosis Date    Gout        Past Surgical History:   Procedure Laterality Date    ARTHROSCOPY, KNEE Right 2000    acl, mcl, meniscus repair       Family History   Problem Relation Age of Onset    Diabetes Paternal Grandmother     Diabetes Paternal Grandfather        Social History     Tobacco Use   Smoking Status Never   Smokeless Tobacco Never       Social History     Substance and Sexual Activity   Alcohol Use Not Currently       Review of  systems:  As per HPI above. All other systems reviewed and negative.      Past Medical, Social, and Family history reviewed and updated in EPIC     Objective     /86 (BP Location: Left arm, Patient Position: Sitting, BP Cuff Size: Adult long)   Pulse 75   Temp 36.8 °C (98.2 °F) (Temporal)   Ht 1.829 m (6')   Wt (!) 142 kg (312 lb 12.8 oz)   SpO2 97%    Body mass index is 42.42 kg/m².    General: alert in no apparent distress.  Cardiovascular: regular rate and rhythm  Pulmonary: lungs : no wheezing   Gastrointestinal: BS present.   Cranial nerves II to XII grossly intact  Nose mild mucus formation  Oropharynx no exudate  Fundi difficult to visualize      Lab Results   Component Value Date/Time    HBA1C 6.4 (H) 03/24/2022 07:42 AM    HBA1C 6.3 (H) 03/03/2021 08:34 AM    HBA1C 6.4 (H) 12/27/2019 08:27 AM       Lab Results   Component Value Date/Time    WBC 4.8 03/24/2022 07:42 AM    HEMOGLOBIN 17.2 03/24/2022 07:42 AM    HEMATOCRIT 53.5 (H) 03/24/2022 07:42 AM    MCV 93.7 03/24/2022 07:42 AM    PLATELETCT 268 03/24/2022 07:42 AM         Lab Results   Component Value Date/Time    SODIUM 141 03/24/2022 07:42 AM    POTASSIUM 4.9 03/24/2022 07:42 AM    GLUCOSE 113 (H) 03/24/2022 07:42 AM    BUN 9 03/24/2022 07:42 AM    CREATININE 1.00 03/24/2022 07:42 AM       Lab Results   Component Value Date/Time    CHOLSTRLTOT 183 03/24/2022 07:42 AM     (H) 03/24/2022 07:42 AM    HDL 47 03/24/2022 07:42 AM    TRIGLYCERIDE 155 (H) 03/24/2022 07:42 AM       Lab Results   Component Value Date/Time    ALTSGPT 46 03/24/2022 07:42 AM           Assessment and Plan     1. Annual physical exam  - HEMOGLOBIN A1C; Future  - ALANINE AMINO-TRANS; Future  - Basic Metabolic Panel; Future  - CBC WITHOUT DIFFERENTIAL; Future  - Lipid Profile; Future  - PROSTATE SPECIFIC AG SCREENING; Future  - TSH; Future  - MICROALBUMIN CREAT RATIO URINE; Future  - VITAMIN D,25 HYDROXY (DEFICIENCY); Future  Routine lab ordered.  Brought the patient  to follow-up after a few days.    2. Gout, unspecified cause, unspecified chronicity, unspecified site  - febuxostat (ULORIC) 80 MG Tab; TAKE 1 TABLET DAILY  Dispense: 90 Tablet; Refill: 3  Chronic condition.  Patient asymptomatic.  Continue with Uloric 80 Mg daily    3. Low vitamin D level  Chronic condition.  Current status unclear.  Blood test ordered for follow-up    4. Prediabetes  Chronic condition.  Current status unclear.  Blood test requested for follow-up.  Recommend low sweet low-carb diet.  Continue to monitor    5. Essential hypertension  - losartan (COZAAR) 50 MG Tab; Take 1 Tablet by mouth every day.  Dispense: 90 Tablet; Refill: 3  Chronic stable condition.  Continue losartan 50 Mg daily    6. BMI 39.0-39.9,adult  Chronic condition.  Uncontrolled.  Recommend diet lifestyle modification.  Advised the patient to lose weight    7. Dyslipidemia  - atorvastatin (LIPITOR) 20 MG Tab; Take 1 tab daily  Dispense: 90 Tablet; Refill: 3  Chronic condition.  Current status unclear.  Lipid panel requested.  Continue low-fat low-cholesterol diet and continue to monitor                  ANTICIPATORY GUIDANCE  Patient Counseling:  -Cholesterol screening. Fasting lipid panel  -Diabetes screening. Fasting blood sugar  -Diet: advised lean meats, fruits, vegetables, whole grains  -Discussed Healthful lifestyle measures. Pt to avoid tobacco, ETOH, and drug use.  -Pt to continue with regular exercise/walking activities  -Advised sun protection, sunscreen use  -Injury prevention: discussed safety seat belts  -Discussed preventive immunizations  -Recommend patient to schedule a yearly eye examination and dental exam                         Please note that this dictation was created using voice recognition software. I have made every reasonable attempt to correct obvious errors, but I expect that there are errors of grammar and possibly content that I did not discover before finalizing the note.    Jairon Mccoy MD  Internal  [facility] primary care clinic

## 2023-11-28 NOTE — ASSESSMENT & PLAN NOTE
Chronic ongoing condition for the patient is taking Uloric 80 Mg daily.  The patient denied recent gout flareup.  Patient tolerated medication well.

## 2023-11-28 NOTE — ASSESSMENT & PLAN NOTE
This is a chronic condition.  The patient is taking losartan 50 Mg daily.  Blood pressure has been well-controlled.  Patient request Rx refills

## 2023-11-28 NOTE — ASSESSMENT & PLAN NOTE
Chronic condition.  The patient is taking vitamin D3 4000 unit daily.  Lab test ordered for follow-up.

## 2023-12-15 ENCOUNTER — OFFICE VISIT (OUTPATIENT)
Dept: URGENT CARE | Facility: PHYSICIAN GROUP | Age: 43
End: 2023-12-15
Payer: COMMERCIAL

## 2023-12-15 VITALS
HEIGHT: 72 IN | RESPIRATION RATE: 16 BRPM | TEMPERATURE: 98.2 F | OXYGEN SATURATION: 96 % | DIASTOLIC BLOOD PRESSURE: 70 MMHG | SYSTOLIC BLOOD PRESSURE: 120 MMHG | HEART RATE: 87 BPM | BODY MASS INDEX: 42.26 KG/M2 | WEIGHT: 312 LBS

## 2023-12-15 DIAGNOSIS — J02.9 VIRAL PHARYNGITIS: ICD-10-CM

## 2023-12-15 LAB — S PYO DNA SPEC NAA+PROBE: NOT DETECTED

## 2023-12-15 PROCEDURE — 87651 STREP A DNA AMP PROBE: CPT

## 2023-12-15 PROCEDURE — 99213 OFFICE O/P EST LOW 20 MIN: CPT

## 2023-12-15 PROCEDURE — 3074F SYST BP LT 130 MM HG: CPT

## 2023-12-15 PROCEDURE — 1126F AMNT PAIN NOTED NONE PRSNT: CPT

## 2023-12-15 PROCEDURE — 3078F DIAST BP <80 MM HG: CPT

## 2023-12-15 ASSESSMENT — PAIN SCALES - GENERAL: PAINLEVEL: NO PAIN

## 2023-12-15 NOTE — PROGRESS NOTES
Chief Complaint   Patient presents with    Cough     X 2 days exposure to strep kids came to visit one of them is strep positive     Body Aches         Subjective:   HISTORY OF PRESENT ILLNESS: Ryan Lora is a 43 y.o. male who presents for cough, nasal congestion and body aches x 2 days. His kids tested positive for strep/    Patient denies fevers, N/V      Medications, Allergies, current problem list, Social and Family history reviewed today in Epic.     Objective:     /70 (BP Location: Left arm, Patient Position: Sitting, BP Cuff Size: Large adult)   Pulse 87   Temp 36.8 °C (98.2 °F) (Temporal)   Resp 16   Ht 1.829 m (6')   Wt (!) 142 kg (312 lb)   SpO2 96%     Physical Exam  Vitals reviewed.   Constitutional:       Appearance: Normal appearance. He is not toxic-appearing.   HENT:      Head:      Jaw: No trismus.      Right Ear: Tympanic membrane normal.      Left Ear: Tympanic membrane normal.      Nose: Rhinorrhea present.      Mouth/Throat:      Mouth: Mucous membranes are moist.      Pharynx: Uvula midline. Posterior oropharyngeal erythema present. No pharyngeal swelling, oropharyngeal exudate or uvula swelling.      Tonsils: No tonsillar exudate or tonsillar abscesses. 1+ on the right. 1+ on the left.      Comments: No muffled voice, trismus, unilateral deviation of the uvula, soft palate fullness or edema. No oral airway compromise, or drooling noted.   Eyes:      Conjunctiva/sclera: Conjunctivae normal.   Cardiovascular:      Rate and Rhythm: Normal rate and regular rhythm.      Heart sounds: Normal heart sounds.   Pulmonary:      Effort: Pulmonary effort is normal. No respiratory distress.      Breath sounds: Normal breath sounds. No decreased breath sounds or wheezing.   Musculoskeletal:      Cervical back: Full passive range of motion without pain and neck supple.   Skin:     General: Skin is warm and dry.   Neurological:      Mental Status: He is alert and oriented to person,  place, and time.   Psychiatric:         Mood and Affect: Mood normal.          Assessment/Plan:     Diagnosis and associated orders    I personally reviewed prior external notes and test results pertinent to today's visit.     1. Viral pharyngitis  POCT CEPHEID GROUP A STREP - PCR            IMPRESSION:  Exam findings reassuring with stable vital signs, No red flag symptoms or exam findings. Explained that S/S are consistent with a viral illness and are self limiting.  No antibiotics are indicated at this time.     OTC symptomatic relief measures were recommended.  Supportive care also discussed to include the use of warm salt water gargles, saline nasal rinses, steam inhalation, and the use of a cool-mist humidifier in the bedroom at night.      Differential diagnosis discussed. Pt was Educated on red flag symptoms. Pt has been Instructed to return to Urgent Care or nearest Emergency Department if symptoms fail to improve, for any change in condition, further concerns, or new concerning symptoms. Patient states understanding of the plan of care and discharge instructions.  They are discharged in stable condition.         Please note that this dictation was created using voice recognition software. I have made a reasonable attempt to correct obvious errors, but I expect that there are errors of grammar and possibly content that I did not discover before finalizing the note.    This note was electronically signed by MAURICE Shaikh

## 2024-01-19 ENCOUNTER — HOSPITAL ENCOUNTER (OUTPATIENT)
Dept: LAB | Facility: MEDICAL CENTER | Age: 44
End: 2024-01-19
Attending: INTERNAL MEDICINE
Payer: COMMERCIAL

## 2024-01-19 DIAGNOSIS — Z00.00 ANNUAL PHYSICAL EXAM: ICD-10-CM

## 2024-01-19 PROBLEM — D75.1 ERYTHROCYTOSIS: Status: ACTIVE | Noted: 2024-01-19

## 2024-01-19 PROBLEM — E11.65 DIABETES MELLITUS WITH HYPERGLYCEMIA (HCC): Status: ACTIVE | Noted: 2021-03-03

## 2024-01-19 PROBLEM — E11.65 DIABETES MELLITUS WITH HYPERGLYCEMIA (HCC): Chronic | Status: ACTIVE | Noted: 2021-03-03

## 2024-01-19 PROBLEM — J02.9 VIRAL PHARYNGITIS: Status: RESOLVED | Noted: 2023-12-15 | Resolved: 2024-01-19

## 2024-01-19 PROBLEM — R74.8 LIVER ENZYME ELEVATION: Status: ACTIVE | Noted: 2024-01-19

## 2024-01-19 LAB
25(OH)D3 SERPL-MCNC: 35 NG/ML (ref 30–100)
ALT SERPL-CCNC: 92 U/L (ref 2–50)
ANION GAP SERPL CALC-SCNC: 13 MMOL/L (ref 7–16)
BUN SERPL-MCNC: 9 MG/DL (ref 8–22)
CALCIUM SERPL-MCNC: 9.7 MG/DL (ref 8.4–10.2)
CHLORIDE SERPL-SCNC: 104 MMOL/L (ref 96–112)
CHOLEST SERPL-MCNC: 219 MG/DL (ref 100–199)
CO2 SERPL-SCNC: 25 MMOL/L (ref 20–33)
CREAT SERPL-MCNC: 0.94 MG/DL (ref 0.5–1.4)
CREAT UR-MCNC: 241.49 MG/DL
ERYTHROCYTE [DISTWIDTH] IN BLOOD BY AUTOMATED COUNT: 44.4 FL (ref 35.9–50)
EST. AVERAGE GLUCOSE BLD GHB EST-MCNC: 169 MG/DL
FASTING STATUS PATIENT QL REPORTED: NORMAL
GFR SERPLBLD CREATININE-BSD FMLA CKD-EPI: 103 ML/MIN/1.73 M 2
GLUCOSE SERPL-MCNC: 155 MG/DL (ref 65–99)
HBA1C MFR BLD: 7.5 % (ref 4–5.6)
HCT VFR BLD AUTO: 55.8 % (ref 42–52)
HDLC SERPL-MCNC: 47 MG/DL
HGB BLD-MCNC: 18.8 G/DL (ref 14–18)
LDLC SERPL CALC-MCNC: 140 MG/DL
MCH RBC QN AUTO: 30.9 PG (ref 27–33)
MCHC RBC AUTO-ENTMCNC: 33.6 G/DL (ref 32.3–36.5)
MCV RBC AUTO: 92 FL (ref 81.4–97.8)
MICROALBUMIN UR-MCNC: 2.5 MG/DL
MICROALBUMIN/CREAT UR: 10 MG/G (ref 0–30)
PLATELET # BLD AUTO: 251 K/UL (ref 164–446)
PMV BLD AUTO: 9.5 FL (ref 9–12.9)
POTASSIUM SERPL-SCNC: 5 MMOL/L (ref 3.6–5.5)
PSA SERPL-MCNC: 0.56 NG/ML (ref 0–4)
RBC # BLD AUTO: 6.06 M/UL (ref 4.7–6.1)
SODIUM SERPL-SCNC: 142 MMOL/L (ref 135–145)
TRIGL SERPL-MCNC: 161 MG/DL (ref 0–149)
TSH SERPL DL<=0.005 MIU/L-ACNC: 1.52 UIU/ML (ref 0.38–5.33)
WBC # BLD AUTO: 5.7 K/UL (ref 4.8–10.8)

## 2024-01-19 PROCEDURE — 85027 COMPLETE CBC AUTOMATED: CPT

## 2024-01-19 PROCEDURE — 84443 ASSAY THYROID STIM HORMONE: CPT

## 2024-01-19 PROCEDURE — 82570 ASSAY OF URINE CREATININE: CPT

## 2024-01-19 PROCEDURE — 84460 ALANINE AMINO (ALT) (SGPT): CPT

## 2024-01-19 PROCEDURE — 36415 COLL VENOUS BLD VENIPUNCTURE: CPT

## 2024-01-19 PROCEDURE — 80048 BASIC METABOLIC PNL TOTAL CA: CPT

## 2024-01-19 PROCEDURE — 80061 LIPID PANEL: CPT

## 2024-01-19 PROCEDURE — 84153 ASSAY OF PSA TOTAL: CPT

## 2024-01-19 PROCEDURE — 83036 HEMOGLOBIN GLYCOSYLATED A1C: CPT

## 2024-01-19 PROCEDURE — 82306 VITAMIN D 25 HYDROXY: CPT

## 2024-01-19 PROCEDURE — 82043 UR ALBUMIN QUANTITATIVE: CPT

## 2024-02-08 ENCOUNTER — OFFICE VISIT (OUTPATIENT)
Dept: MEDICAL GROUP | Facility: PHYSICIAN GROUP | Age: 44
End: 2024-02-08
Payer: COMMERCIAL

## 2024-02-08 VITALS
BODY MASS INDEX: 42.58 KG/M2 | SYSTOLIC BLOOD PRESSURE: 120 MMHG | HEIGHT: 72 IN | OXYGEN SATURATION: 95 % | WEIGHT: 314.4 LBS | HEART RATE: 78 BPM | DIASTOLIC BLOOD PRESSURE: 86 MMHG | TEMPERATURE: 97.9 F

## 2024-02-08 DIAGNOSIS — R74.8 LIVER ENZYME ELEVATION: ICD-10-CM

## 2024-02-08 DIAGNOSIS — E78.5 DYSLIPIDEMIA: ICD-10-CM

## 2024-02-08 DIAGNOSIS — D75.1 ERYTHROCYTOSIS: ICD-10-CM

## 2024-02-08 DIAGNOSIS — E66.01 SEVERE OBESITY (HCC): ICD-10-CM

## 2024-02-08 DIAGNOSIS — E11.65 DIABETES MELLITUS WITH HYPERGLYCEMIA (HCC): ICD-10-CM

## 2024-02-08 DIAGNOSIS — I10 ESSENTIAL HYPERTENSION: Chronic | ICD-10-CM

## 2024-02-08 DIAGNOSIS — M10.9 GOUT, UNSPECIFIED CAUSE, UNSPECIFIED CHRONICITY, UNSPECIFIED SITE: Chronic | ICD-10-CM

## 2024-02-08 PROCEDURE — 3079F DIAST BP 80-89 MM HG: CPT | Performed by: INTERNAL MEDICINE

## 2024-02-08 PROCEDURE — 3074F SYST BP LT 130 MM HG: CPT | Performed by: INTERNAL MEDICINE

## 2024-02-08 PROCEDURE — 99215 OFFICE O/P EST HI 40 MIN: CPT | Performed by: INTERNAL MEDICINE

## 2024-02-08 RX ORDER — LANCETS 30 GAUGE
EACH MISCELLANEOUS
Qty: 200 EACH | Refills: 6 | Status: SHIPPED | OUTPATIENT
Start: 2024-02-08

## 2024-02-08 RX ORDER — ROSUVASTATIN CALCIUM 20 MG/1
20 TABLET, COATED ORAL EVERY EVENING
Qty: 90 TABLET | Refills: 3 | Status: SHIPPED | OUTPATIENT
Start: 2024-02-08

## 2024-02-08 RX ORDER — EMPAGLIFLOZIN 10 MG/1
10 TABLET, FILM COATED ORAL DAILY
Qty: 90 TABLET | Refills: 3 | Status: SHIPPED | OUTPATIENT
Start: 2024-02-08 | End: 2024-02-12

## 2024-02-08 RX ORDER — GLUCOSAMINE HCL/CHONDROITIN SU 500-400 MG
CAPSULE ORAL
Qty: 200 EACH | Refills: 6 | Status: SHIPPED | OUTPATIENT
Start: 2024-02-08

## 2024-02-08 ASSESSMENT — PATIENT HEALTH QUESTIONNAIRE - PHQ9: CLINICAL INTERPRETATION OF PHQ2 SCORE: 0

## 2024-02-08 NOTE — ASSESSMENT & PLAN NOTE
Chronic condition.  The patient taking losartan 50 mg daily.  Blood pressure has been well-controlled no significant side effects reported.

## 2024-02-08 NOTE — ASSESSMENT & PLAN NOTE
This is a new problem.  Blood test showed A1c 7.5%.  Test result discussed with the patient.  Pathophysiology of diabetes discussed with the patient as well as potential complication.  Diet and exercise advised.  reCommend patient to start treatment.

## 2024-02-08 NOTE — ASSESSMENT & PLAN NOTE
New condition.  Recent blood test showed hemoglobin 18.8.  The patient does not use testosterone treatment.  Recommend to repeat lab test as well as erythropoietin level.  Referred to sleep specialist to evaluate for possible sleep apnea.

## 2024-02-08 NOTE — PROGRESS NOTES
PRIMARY CARE CLINIC VISIT        Chief Complaint   Patient presents with    Follow-Up     Lab results.       Follow-up diabetes  Hyperlipidemia  Erythrocytosis  Elevated liver enzyme  Obesity        History of Present Illness     Diabetes mellitus with hyperglycemia (HCC)  This is a new problem.  Blood test showed A1c 7.5%.  Test result discussed with the patient.  Pathophysiology of diabetes discussed with the patient as well as potential complication.  Diet and exercise advised.  reCommend patient to start treatment.    Dyslipidemia  Chronic condition.  Uncontrolled.  Lipid panel result discussed with the patient.  Patient has been taking atorvastatin 20 Mg daily.    Erythrocytosis  New condition.  Recent blood test showed hemoglobin 18.8.  The patient does not use testosterone treatment.  Recommend to repeat lab test as well as erythropoietin level.  Referred to sleep specialist to evaluate for possible sleep apnea.    Liver enzyme elevation  Chronic condition.  Recent lab test show ALT 92.  Patient asymptomatic.  He does not drink alcohol.  Additional workup recommended.    Severe obesity (HCC)  Chronic condition.  Discussed with the patient regarding diet, exercise, and lifestyle modification to help achieve and maintain healthy weight         Essential hypertension  Chronic condition.  The patient taking losartan 50 mg daily.  Blood pressure has been well-controlled no significant side effects reported.    Gout  Chronic condition.  Patient is taking Uloric 80 Mg daily.  The patient denies recent gout flare.    Current Outpatient Medications on File Prior to Visit   Medication Sig Dispense Refill    febuxostat (ULORIC) 80 MG Tab TAKE 1 TABLET DAILY 90 Tablet 3    losartan (COZAAR) 50 MG Tab Take 1 Tablet by mouth every day. 90 Tablet 3    Cholecalciferol (VITAMIN D3) 50 MCG (2000 UT) Chew Tab Chew 4,000 Units every day.       No current facility-administered medications on file prior to visit.        Allergies:  Patient has no known allergies.    Current Outpatient Medications Ordered in Epic   Medication Sig Dispense Refill    Empagliflozin (JARDIANCE) 10 MG Tab tablet Take 1 Tablet by mouth every day. 90 Tablet 3    rosuvastatin (CRESTOR) 20 MG Tab Take 1 Tablet by mouth every evening. 90 Tablet 3    febuxostat (ULORIC) 80 MG Tab TAKE 1 TABLET DAILY 90 Tablet 3    losartan (COZAAR) 50 MG Tab Take 1 Tablet by mouth every day. 90 Tablet 3    Cholecalciferol (VITAMIN D3) 50 MCG (2000 UT) Chew Tab Chew 4,000 Units every day.       No current [facility]-ordered facility-administered medications on file.       Past Medical History:   Diagnosis Date    Gout        Past Surgical History:   Procedure Laterality Date    ARTHROSCOPY, KNEE Right 2000    acl, mcl, meniscus repair       Family History   Problem Relation Age of Onset    Diabetes Paternal Grandmother     Diabetes Paternal Grandfather        Social History     Tobacco Use   Smoking Status Never   Smokeless Tobacco Never       Social History     Substance and Sexual Activity   Alcohol Use Not Currently       Review of systems.  As per HPI above. All other systems reviewed and negative.      Past Medical, Social, and Family history reviewed and updated in EPIC     Objective     /86 (BP Location: Left arm, Patient Position: Sitting, BP Cuff Size: Adult)   Pulse 78   Temp 36.6 °C (97.9 °F) (Temporal)   Ht 1.829 m (6')   Wt (!) 143 kg (314 lb 6.4 oz)   SpO2 95%    Body mass index is 42.64 kg/m².    General: alert in no apparent distress.  Cardiovascular: regular rate and rhythm  Pulmonary: lungs : no wheezing   Gastrointestinal: BS present.   Monofilament testing with a 10 gram force: sensation intact: intact bilaterally  Visual Inspection: Feet without maceration, ulcers, fissures.  Pedal pulses: intact bilaterally       Lab Results   Component Value Date/Time    HBA1C 7.5 (H) 01/19/2024 08:39 AM    HBA1C 6.4 (H) 03/24/2022 07:42 AM    HBA1C 6.3 (H) 03/03/2021 08:34 AM        Lab Results   Component Value Date/Time    WBC 5.7 01/19/2024 08:39 AM    HEMOGLOBIN 18.8 (H) 01/19/2024 08:39 AM    HEMATOCRIT 55.8 (H) 01/19/2024 08:39 AM    MCV 92.0 01/19/2024 08:39 AM    PLATELETCT 251 01/19/2024 08:39 AM         Lab Results   Component Value Date/Time    SODIUM 142 01/19/2024 08:39 AM    POTASSIUM 5.0 01/19/2024 08:39 AM    GLUCOSE 155 (H) 01/19/2024 08:39 AM    BUN 9 01/19/2024 08:39 AM    CREATININE 0.94 01/19/2024 08:39 AM       Lab Results   Component Value Date/Time    CHOLSTRLTOT 219 (H) 01/19/2024 08:39 AM    TRIGLYCERIDE 161 (H) 01/19/2024 08:39 AM    HDL 47 01/19/2024 08:39 AM     (H) 01/19/2024 08:39 AM       Lab Results   Component Value Date/Time    ALTSGPT 92 (H) 01/19/2024 08:39 AM             Assessment and Plan     1. Diabetes mellitus with hyperglycemia (HCC)  This is a new condition.  Extended time spent with the patient today.  Counseling given.  Will start patient on Jardiance 10 mg daily.  Diet and exercise advised.  Patient will try to lose weight.  Discussed with the patient goals for Diabetes management:  -HbA1C < 7% /  Fasting BG   /  2 hrs postprandial  - 180    Pt's education:   -The importance of increasing physical activity to improve diabetes control  discussed with the patient.   -Patient was also educated on changing diet and making better choices to help control blood sugar.          - POCT Retinal Eye Exam  - Diabetic Monofilament LE Exam  - Empagliflozin (JARDIANCE) 10 MG Tab tablet; Take 1 Tablet by mouth every day.  Dispense: 90 Tablet; Refill: 3  - HEMOGLOBIN A1C; Future  - Basic Metabolic Panel; Future  - MICROALBUMIN CREAT RATIO URINE; Future    2. Dyslipidemia  - rosuvastatin (CRESTOR) 20 MG Tab; Take 1 Tablet by mouth every evening.  Dispense: 90 Tablet; Refill: 3  - Lipid Profile; Future  Chronic condition.  Uncontrolled.  Recommend patient to stop atorvastatin.  Patient will start Crestor 20 Mg daily.  Side effect of  medication discussed with the patient.  Follow-up lab test approximately 3 months    3. Erythrocytosis  This is a new problem.  Rule out possible sleep apnea versus others.  - Referral to Pulmonary and Sleep Medicine  - ERYTHROPOIETIN; Future  - CBC WITH DIFFERENTIAL; Future  Follow-up after lab test done      4. Liver enzyme elevation  New condition.  Suspect hepatosteatosis.  Rule out other causes patient does not drink alcohol.  - HEPATIC FUNCTION PANEL; Future  - US-RUQ; Future  - HEP B SURFACE AB; Future  - HEP B SURFACE ANTIGEN; Future  Follow-up after workup done.    5. Gout, unspecified cause, unspecified chronicity, unspecified site  Chronic condition.  Continue Uloric 80 Mg daily    6. Essential hypertension  Chronic stable.  Continue losartan 50 mg daily      7. Severe obesity (HCC)  Chronic condition.  Uncontrolled.  Recommend diet and lifestyle modification.  Encouraged patient to lose weight.      Recommend follow-up in 2 to 3 months with lab prior          Attestation: I spent:   46  min -  That includes time for chart review before the visit, the actual patient visit, and time spent on documentation in EMR after the visit.  Chart review/prep, review of other providers' records, imaging/lab review, face-to-face time for history/examination, pt's counseling/education, ordering, prescribing,  review of results/meds/ treatment plan with patient, and care coordination.             Please note that this dictation was created using voice recognition software. I have made every reasonable attempt to correct obvious errors, but I expect that there are errors of grammar and possibly content that I did not discover before finalizing the note.    Jairon Mccoy MD  Internal Medicine  [facility]

## 2024-02-08 NOTE — ASSESSMENT & PLAN NOTE
Chronic condition.  Recent lab test show ALT 92.  Patient asymptomatic.  He does not drink alcohol.  Additional workup recommended.

## 2024-02-08 NOTE — ASSESSMENT & PLAN NOTE
Chronic condition.  Uncontrolled.  Lipid panel result discussed with the patient.  Patient has been taking atorvastatin 20 Mg daily.

## 2024-02-14 ENCOUNTER — APPOINTMENT (OUTPATIENT)
Dept: MEDICAL GROUP | Facility: PHYSICIAN GROUP | Age: 44
End: 2024-02-14
Payer: COMMERCIAL

## 2024-02-21 ENCOUNTER — HOSPITAL ENCOUNTER (OUTPATIENT)
Dept: RADIOLOGY | Facility: MEDICAL CENTER | Age: 44
End: 2024-02-21
Attending: INTERNAL MEDICINE
Payer: COMMERCIAL

## 2024-02-21 DIAGNOSIS — R74.8 LIVER ENZYME ELEVATION: ICD-10-CM

## 2024-02-21 PROBLEM — K76.0 FATTY LIVER: Status: ACTIVE | Noted: 2024-02-21

## 2024-02-21 PROCEDURE — 76705 ECHO EXAM OF ABDOMEN: CPT

## 2024-02-24 RX ORDER — EMPAGLIFLOZIN 10 MG/1
10 TABLET, FILM COATED ORAL DAILY
Qty: 90 TABLET | Refills: 3 | Status: SHIPPED | OUTPATIENT
Start: 2024-02-24

## 2024-03-21 PROBLEM — G47.33 OSA (OBSTRUCTIVE SLEEP APNEA): Status: ACTIVE | Noted: 2024-03-21

## 2024-03-21 PROBLEM — G47.33 OSA (OBSTRUCTIVE SLEEP APNEA): Chronic | Status: ACTIVE | Noted: 2024-03-21

## 2024-04-11 ENCOUNTER — HOSPITAL ENCOUNTER (OUTPATIENT)
Dept: LAB | Facility: MEDICAL CENTER | Age: 44
End: 2024-04-11
Attending: INTERNAL MEDICINE
Payer: COMMERCIAL

## 2024-04-11 DIAGNOSIS — D75.1 ERYTHROCYTOSIS: ICD-10-CM

## 2024-04-11 DIAGNOSIS — E78.5 DYSLIPIDEMIA: ICD-10-CM

## 2024-04-11 DIAGNOSIS — R74.8 LIVER ENZYME ELEVATION: ICD-10-CM

## 2024-04-11 DIAGNOSIS — E11.65 DIABETES MELLITUS WITH HYPERGLYCEMIA (HCC): ICD-10-CM

## 2024-04-11 LAB
ALBUMIN SERPL BCP-MCNC: 4.3 G/DL (ref 3.2–4.9)
ALP SERPL-CCNC: 77 U/L (ref 30–99)
ALT SERPL-CCNC: 72 U/L (ref 2–50)
ANION GAP SERPL CALC-SCNC: 13 MMOL/L (ref 7–16)
AST SERPL-CCNC: 50 U/L (ref 12–45)
BASOPHILS # BLD AUTO: 0.4 % (ref 0–1.8)
BASOPHILS # BLD: 0.02 K/UL (ref 0–0.12)
BILIRUB CONJ SERPL-MCNC: <0.2 MG/DL (ref 0.1–0.5)
BILIRUB INDIRECT SERPL-MCNC: ABNORMAL MG/DL (ref 0–1)
BILIRUB SERPL-MCNC: 0.6 MG/DL (ref 0.1–1.5)
BUN SERPL-MCNC: 10 MG/DL (ref 8–22)
CALCIUM SERPL-MCNC: 8.9 MG/DL (ref 8.4–10.2)
CHLORIDE SERPL-SCNC: 104 MMOL/L (ref 96–112)
CHOLEST SERPL-MCNC: 148 MG/DL (ref 100–199)
CO2 SERPL-SCNC: 22 MMOL/L (ref 20–33)
CREAT SERPL-MCNC: 0.85 MG/DL (ref 0.5–1.4)
CREAT UR-MCNC: 116.75 MG/DL
EOSINOPHIL # BLD AUTO: 0.18 K/UL (ref 0–0.51)
EOSINOPHIL NFR BLD: 3.6 % (ref 0–6.9)
ERYTHROCYTE [DISTWIDTH] IN BLOOD BY AUTOMATED COUNT: 42.6 FL (ref 35.9–50)
EST. AVERAGE GLUCOSE BLD GHB EST-MCNC: 146 MG/DL
FASTING STATUS PATIENT QL REPORTED: NORMAL
GFR SERPLBLD CREATININE-BSD FMLA CKD-EPI: 110 ML/MIN/1.73 M 2
GLUCOSE SERPL-MCNC: 112 MG/DL (ref 65–99)
HBA1C MFR BLD: 6.7 % (ref 4–5.6)
HBV SURFACE AB SERPL IA-ACNC: <3.5 MIU/ML (ref 0–10)
HBV SURFACE AG SER QL: NORMAL
HCT VFR BLD AUTO: 51.7 % (ref 42–52)
HDLC SERPL-MCNC: 39 MG/DL
HGB BLD-MCNC: 17.4 G/DL (ref 14–18)
IMM GRANULOCYTES # BLD AUTO: 0.02 K/UL (ref 0–0.11)
IMM GRANULOCYTES NFR BLD AUTO: 0.4 % (ref 0–0.9)
LDLC SERPL CALC-MCNC: 85 MG/DL
LYMPHOCYTES # BLD AUTO: 1.86 K/UL (ref 1–4.8)
LYMPHOCYTES NFR BLD: 37.3 % (ref 22–41)
MCH RBC QN AUTO: 30.6 PG (ref 27–33)
MCHC RBC AUTO-ENTMCNC: 33.7 G/DL (ref 32.3–36.5)
MCV RBC AUTO: 90.9 FL (ref 81.4–97.8)
MICROALBUMIN UR-MCNC: <1.2 MG/DL
MICROALBUMIN/CREAT UR: NORMAL MG/G (ref 0–30)
MONOCYTES # BLD AUTO: 0.43 K/UL (ref 0–0.85)
MONOCYTES NFR BLD AUTO: 8.6 % (ref 0–13.4)
NEUTROPHILS # BLD AUTO: 2.47 K/UL (ref 1.82–7.42)
NEUTROPHILS NFR BLD: 49.7 % (ref 44–72)
NRBC # BLD AUTO: 0 K/UL
NRBC BLD-RTO: 0 /100 WBC (ref 0–0.2)
PLATELET # BLD AUTO: 261 K/UL (ref 164–446)
PMV BLD AUTO: 9.8 FL (ref 9–12.9)
POTASSIUM SERPL-SCNC: 4.1 MMOL/L (ref 3.6–5.5)
PROT SERPL-MCNC: 7.7 G/DL (ref 6–8.2)
RBC # BLD AUTO: 5.69 M/UL (ref 4.7–6.1)
SODIUM SERPL-SCNC: 139 MMOL/L (ref 135–145)
TRIGL SERPL-MCNC: 122 MG/DL (ref 0–149)
WBC # BLD AUTO: 5 K/UL (ref 4.8–10.8)

## 2024-04-11 PROCEDURE — 86706 HEP B SURFACE ANTIBODY: CPT

## 2024-04-11 PROCEDURE — 82570 ASSAY OF URINE CREATININE: CPT

## 2024-04-11 PROCEDURE — 87340 HEPATITIS B SURFACE AG IA: CPT

## 2024-04-11 PROCEDURE — 85025 COMPLETE CBC W/AUTO DIFF WBC: CPT

## 2024-04-11 PROCEDURE — 82668 ASSAY OF ERYTHROPOIETIN: CPT

## 2024-04-11 PROCEDURE — 83036 HEMOGLOBIN GLYCOSYLATED A1C: CPT

## 2024-04-11 PROCEDURE — 80048 BASIC METABOLIC PNL TOTAL CA: CPT

## 2024-04-11 PROCEDURE — 80061 LIPID PANEL: CPT

## 2024-04-11 PROCEDURE — 80076 HEPATIC FUNCTION PANEL: CPT

## 2024-04-11 PROCEDURE — 82043 UR ALBUMIN QUANTITATIVE: CPT

## 2024-04-11 PROCEDURE — 36415 COLL VENOUS BLD VENIPUNCTURE: CPT

## 2024-04-12 LAB — EPO SERPL-ACNC: 11 MU/ML (ref 4–27)

## 2024-04-16 ENCOUNTER — OFFICE VISIT (OUTPATIENT)
Dept: MEDICAL GROUP | Facility: PHYSICIAN GROUP | Age: 44
End: 2024-04-16
Payer: COMMERCIAL

## 2024-04-16 VITALS
BODY MASS INDEX: 40.35 KG/M2 | DIASTOLIC BLOOD PRESSURE: 74 MMHG | SYSTOLIC BLOOD PRESSURE: 118 MMHG | HEIGHT: 72 IN | WEIGHT: 297.9 LBS | HEART RATE: 76 BPM | TEMPERATURE: 97.5 F | OXYGEN SATURATION: 95 %

## 2024-04-16 DIAGNOSIS — E66.01 SEVERE OBESITY (HCC): Chronic | ICD-10-CM

## 2024-04-16 DIAGNOSIS — R74.8 LIVER ENZYME ELEVATION: ICD-10-CM

## 2024-04-16 DIAGNOSIS — M10.9 GOUT, UNSPECIFIED CAUSE, UNSPECIFIED CHRONICITY, UNSPECIFIED SITE: Chronic | ICD-10-CM

## 2024-04-16 DIAGNOSIS — E11.65 TYPE 2 DIABETES MELLITUS WITH HYPERGLYCEMIA, WITHOUT LONG-TERM CURRENT USE OF INSULIN (HCC): Chronic | ICD-10-CM

## 2024-04-16 DIAGNOSIS — D75.1 ERYTHROCYTOSIS: ICD-10-CM

## 2024-04-16 DIAGNOSIS — E78.5 DYSLIPIDEMIA: Chronic | ICD-10-CM

## 2024-04-16 DIAGNOSIS — K76.0 HEPATIC STEATOSIS: ICD-10-CM

## 2024-04-16 DIAGNOSIS — I10 ESSENTIAL HYPERTENSION: Chronic | ICD-10-CM

## 2024-04-16 PROCEDURE — 99214 OFFICE O/P EST MOD 30 MIN: CPT | Performed by: INTERNAL MEDICINE

## 2024-04-16 PROCEDURE — 3078F DIAST BP <80 MM HG: CPT | Performed by: INTERNAL MEDICINE

## 2024-04-16 PROCEDURE — 3074F SYST BP LT 130 MM HG: CPT | Performed by: INTERNAL MEDICINE

## 2024-04-16 ASSESSMENT — FIBROSIS 4 INDEX: FIB4 SCORE: 0.97

## 2024-04-16 NOTE — ASSESSMENT & PLAN NOTE
Chronic condition.  The patient is presently taking losartan 50 mg daily.  Patient tolerating medication well.

## 2024-04-16 NOTE — PROGRESS NOTES
PRIMARY CARE CLINIC VISIT        Chief Complaint   Patient presents with    Follow-Up      Diabetes follow-up  Lab test results  Obesity  Elevated liver enzymes  Hepatic steatosis  Gout  Hypertension  Hyperlipidemia  Erythrocytosis        History of Present Illness     Diabetes mellitus with hyperglycemia (HCC)  Chronic condition.  The patient is a taking Jardiance.  Recent A1c below 7%.  The patient denies significant hypoglycemic symptoms.    Liver enzyme elevation  Chronic condition.  Recent ultrasound showed changes suggestive of hepatic steatosis.  Lab test result discussed with the patient.  Advised the patient to lose weight    Severe obesity (HCC)  Chronic condition.  Discussed with the patient regarding diet, exercise, and lifestyle modification to help achieve and maintain healthy weight         Gout  Chronic condition.  The patient is taking Uloric 80 Mg.  The patient tolerated medication well.    Dyslipidemia  Chronic condition.  Patient being treated with Crestor.  Patient tolerating medication well.    Erythrocytosis  Chronic condition.  Recent lab test show normal hemoglobin and hematocrit.  Patient asymptomatic.    Essential hypertension  Chronic condition.  The patient is presently taking losartan 50 mg daily.  Patient tolerating medication well.    Current Outpatient Medications on File Prior to Visit   Medication Sig Dispense Refill    Empagliflozin (JARDIANCE) 10 MG Tab tablet Take 1 Tablet by mouth every day. 90 Tablet 3    rosuvastatin (CRESTOR) 20 MG Tab Take 1 Tablet by mouth every evening. 90 Tablet 3    febuxostat (ULORIC) 80 MG Tab TAKE 1 TABLET DAILY 90 Tablet 3    losartan (COZAAR) 50 MG Tab Take 1 Tablet by mouth every day. 90 Tablet 3    Cholecalciferol (VITAMIN D3) 50 MCG (2000 UT) Chew Tab Chew 4,000 Units every day.      Blood Glucose Meter Kit Freestyle FREEDOM Lite.  Check blood sugar  1x per day . ICD10 code E11.69 1 Kit 0    Blood Glucose Test Strips FREESTYLE FREEDOM LITE .  Check blood sugar 1x per day .  ICD10 code E11.69 100 Strip 6    Lancets Per insurance coverage. Check blood sugar 1x per day . ICD10 code E11.69 200 Each 6    Alcohol Swabs Wipe site with prep pad prior to injection 1x daily. ICD10 code E11.69 200 Each 6     No current facility-administered medications on file prior to visit.        Allergies: Patient has no known allergies.    Current Outpatient Medications Ordered in Epic   Medication Sig Dispense Refill    Empagliflozin (JARDIANCE) 10 MG Tab tablet Take 1 Tablet by mouth every day. 90 Tablet 3    rosuvastatin (CRESTOR) 20 MG Tab Take 1 Tablet by mouth every evening. 90 Tablet 3    febuxostat (ULORIC) 80 MG Tab TAKE 1 TABLET DAILY 90 Tablet 3    losartan (COZAAR) 50 MG Tab Take 1 Tablet by mouth every day. 90 Tablet 3    Cholecalciferol (VITAMIN D3) 50 MCG (2000 UT) Chew Tab Chew 4,000 Units every day.      Blood Glucose Meter Kit Freestyle FREEDOM Lite.  Check blood sugar  1x per day . ICD10 code E11.69 1 Kit 0    Blood Glucose Test Strips FREESTYLE FREEDOM LITE . Check blood sugar 1x per day .  ICD10 code E11.69 100 Strip 6    Lancets Per insurance coverage. Check blood sugar 1x per day . ICD10 code E11.69 200 Each 6    Alcohol Swabs Wipe site with prep pad prior to injection 1x daily. ICD10 code E11.69 200 Each 6     No current Epic-ordered facility-administered medications on file.       Past Medical History:   Diagnosis Date    Gout        Past Surgical History:   Procedure Laterality Date    ARTHROSCOPY, KNEE Right 2000    acl, mcl, meniscus repair       Family History   Problem Relation Age of Onset    Diabetes Paternal Grandmother     Diabetes Paternal Grandfather        Social History     Tobacco Use   Smoking Status Never   Smokeless Tobacco Never       Social History     Substance and Sexual Activity   Alcohol Use Not Currently       Review of systems  As per HPI above. All other systems reviewed and negative.      Past Medical, Social, and Family  history reviewed and updated in [facility]       LAB DATA:    Lab Results   Component Value Date/Time    HBA1C 6.7 (H) 04/11/2024 07:16 AM    HBA1C 7.5 (H) 01/19/2024 08:39 AM    HBA1C 6.4 (H) 03/24/2022 07:42 AM       Lab Results   Component Value Date/Time    WBC 5.0 04/11/2024 07:16 AM    HEMOGLOBIN 17.4 04/11/2024 07:16 AM    HEMATOCRIT 51.7 04/11/2024 07:16 AM    MCV 90.9 04/11/2024 07:16 AM    PLATELETCT 261 04/11/2024 07:16 AM       Lab Results   Component Value Date/Time    SODIUM 139 04/11/2024 07:16 AM    POTASSIUM 4.1 04/11/2024 07:16 AM    GLUCOSE 112 (H) 04/11/2024 07:16 AM    BUN 10 04/11/2024 07:16 AM    CREATININE 0.85 04/11/2024 07:16 AM       Lab Results   Component Value Date/Time    CHOLSTRLTOT 148 04/11/2024 07:16 AM    TRIGLYCERIDE 122 04/11/2024 07:16 AM    HDL 39 (A) 04/11/2024 07:16 AM    LDL 85 04/11/2024 07:16 AM       Lab Results   Component Value Date/Time    ALTSGPT 72 (H) 04/11/2024 07:16 AM          Objective     /74 (BP Location: Right arm, Patient Position: Sitting, BP Cuff Size: Large adult)   Pulse 76   Temp 36.4 °C (97.5 °F) (Temporal)   Ht 1.829 m (6')   Wt (!) 135 kg (297 lb 14.4 oz)   SpO2 95%    Body mass index is 40.4 kg/m².    General: alert in no apparent distress.  Cardiovascular: regular rate and rhythm  Pulmonary: lungs : no wheezing   Gastrointestinal: BS present.       Assessment and Plan     1. Type 2 diabetes mellitus with hyperglycemia, without long-term current use of insulin ([facility])  - POCT Retinal Eye Exam  - HEMOGLOBIN A1C; Future  - Basic Metabolic Panel; Future    Chronic condition.  Excellent control.  Continue with Jardiance 10 mg daily.  Discussed with the patient goals for Diabetes management:  -HbA1C < 7% /  Fasting BG   /  2 hrs postprandial  - 180    Pt's education:   -The importance of increasing physical activity to improve diabetes control  discussed with the patient.   -Patient was also educated on changing diet and making better  choices to help control blood sugar.            2. Severe obesity (HCC)  3. Liver enzyme elevation  4. Hepatic steatosis  Chronic condition.  Advised the patient diet and lifestyle modification.  Encouraged patient to lose weight.  Repeat lab test next visit.    5. Gout, unspecified cause, unspecified chronicity, unspecified site  Chronic stable condition.  Continue Uloric 80 Mg daily    6. Essential hypertension  Chronic stable condition.  Continue losartan 50 Mg daily    7. Dyslipidemia  - Lipid Profile; Future  Chronic condition.  Stable.  Continue Crestor 20 Mg daily.    8. Erythrocytosis  The hemoglobin and hematocrit are now within the normal range.    Blood test result discussed with the patient.        Attestation: I spent:   33  minutes -    That includes time for chart review before the visit, the actual patient visit, and time spent on documentation in EMR after the visit.  Chart review/prep, review of other providers' records, imaging/lab review, face-to-face time for history/examination, pt's counseling/education, ordering, prescribing,  review of results/meds/ treatment plan with patient, and care coordination.           Healthcare Maintenance       Health Maintenance Due   Topic Date Due    HIV Screening  Never done    Pneumococcal Vaccine: 0-64 Years (1 of 2 - PCV) Never done    Diabetes: Retinopathy Screening  Never done    COVID-19 Vaccine (3 - 2023-24 season) 09/01/2023               Please note that this dictation was created using voice recognition software. I have made every reasonable attempt to correct obvious errors, but I expect that there are errors of grammar and possibly content that I did not discover before finalizing the note.    Jairon Mccoy MD  Internal Medicine  [facility]

## 2024-04-16 NOTE — ASSESSMENT & PLAN NOTE
Chronic condition.  Recent ultrasound showed changes suggestive of hepatic steatosis.  Lab test result discussed with the patient.  Advised the patient to lose weight

## 2024-04-16 NOTE — ASSESSMENT & PLAN NOTE
Chronic condition.  The patient is a taking Jardiance.  Recent A1c below 7%.  The patient denies significant hypoglycemic symptoms.

## 2024-04-30 RX ORDER — EMPAGLIFLOZIN 10 MG/1
10 TABLET, FILM COATED ORAL DAILY
Qty: 90 TABLET | Refills: 0 | Status: SHIPPED | OUTPATIENT
Start: 2024-04-30

## 2024-04-30 NOTE — TELEPHONE ENCOUNTER
Received request via: Patient    Was the patient seen in the last year in this department? Yes    Does the patient have an active prescription (recently filled or refills available) for medication(s) requested? No    Pharmacy Name: [facility]    Does the patient have care home Plus and need 100 day supply (blood pressure, diabetes and cholesterol meds only)? Patient does not have SCP

## 2024-06-20 ENCOUNTER — TELEPHONE (OUTPATIENT)
Dept: MEDICAL GROUP | Facility: PHYSICIAN GROUP | Age: 44
End: 2024-06-20
Payer: COMMERCIAL

## 2024-06-20 NOTE — TELEPHONE ENCOUNTER
FINAL PRIOR AUTHORIZATION STATUS:    1.  Name of Medication & Dose: febuxostat     2. Prior Auth Status: Approved through 6/20/24     3. Action Taken: Pharmacy Notified: yes Patient Notified: yes

## 2024-06-20 NOTE — TELEPHONE ENCOUNTER
DOCUMENTATION OF PAR STATUS:    1. Name of Medication & Dose: febuxostat 80mg tabs     2. Name of Prescription Coverage Company & phone #: [facility]    3. Date Prior Auth Submitted: 6/20/24    4. What information was given to obtain insurance decision? Cover My Meds    5. Prior Auth Status? Pending    6. Patient Notified: yes

## 2024-11-03 DIAGNOSIS — I10 ESSENTIAL HYPERTENSION: ICD-10-CM

## 2024-11-04 RX ORDER — LOSARTAN POTASSIUM 50 MG/1
50 TABLET ORAL DAILY
Qty: 90 TABLET | Refills: 3 | Status: SHIPPED | OUTPATIENT
Start: 2024-11-04

## 2024-11-04 NOTE — TELEPHONE ENCOUNTER
Received request via: Pharmacy    Was the patient seen in the last year in this department? Yes    Does the patient have an active prescription (recently filled or refills available) for medication(s) requested? No        Does the patient have detention Plus and need 100-day supply? (This applies to ALL medications) Patient does not have SCP

## 2025-01-12 DIAGNOSIS — E78.5 DYSLIPIDEMIA: ICD-10-CM

## 2025-01-13 RX ORDER — ROSUVASTATIN CALCIUM 20 MG/1
20 TABLET, COATED ORAL EVERY EVENING
Qty: 90 TABLET | Refills: 0 | Status: SHIPPED | OUTPATIENT
Start: 2025-01-13

## 2025-01-13 NOTE — TELEPHONE ENCOUNTER
Received request via: Pharmacy    Was the patient seen in the last year in this department? Yes    Does the patient have an active prescription (recently filled or refills available) for medication(s) requested? No    Does the patient have residential Plus and need 100-day supply? (This applies to ALL medications) Patient does not have SCP

## 2025-01-27 DIAGNOSIS — M10.9 GOUT, UNSPECIFIED CAUSE, UNSPECIFIED CHRONICITY, UNSPECIFIED SITE: Chronic | ICD-10-CM

## 2025-01-27 RX ORDER — FEBUXOSTAT 80 MG/1
TABLET, FILM COATED ORAL
Qty: 90 TABLET | Refills: 0 | Status: SHIPPED | OUTPATIENT
Start: 2025-01-27

## 2025-01-27 NOTE — TELEPHONE ENCOUNTER
Received request via: Pharmacy    Was the patient seen in the last year in this department? Yes    Does the patient have an active prescription (recently filled or refills available) for medication(s) requested? No    Pharmacy Name:   [facility] [place] [address]  [address]  [facility] [address]  Phone: [phone] Fax: [phone]          Does the patient have half-way Plus and need 100-day supply? (This applies to ALL medications) Patient does not have SCP

## 2025-02-10 ENCOUNTER — TELEPHONE (OUTPATIENT)
Dept: MEDICAL GROUP | Facility: PHYSICIAN GROUP | Age: 45
End: 2025-02-10
Payer: COMMERCIAL

## 2025-02-10 NOTE — TELEPHONE ENCOUNTER
DOCUMENTATION OF PAR STATUS:    1. Name of Medication & Dose: Jardiance 10mg tab     2. Name of Prescription Coverage Company & phone #: [employer]    3. Date Prior Auth Submitted: 2/10/25    4. What information was given to obtain insurance decision? Cover My Meds    5. Prior Auth Status? Pending    6. Patient Notified: yes

## 2025-02-24 RX ORDER — EMPAGLIFLOZIN 10 MG/1
10 TABLET, FILM COATED ORAL DAILY
Qty: 90 TABLET | Refills: 3 | Status: SHIPPED | OUTPATIENT
Start: 2025-02-24

## 2025-02-24 NOTE — TELEPHONE ENCOUNTER
Received request via: Pharmacy    Was the patient seen in the last year in this department? Yes    Does the patient have an active prescription (recently filled or refills available) for medication(s) requested? No      Does the patient have [facility] Plus and need 100-day supply? (This applies to ALL medications) Patient does not have SCP

## 2025-03-10 DIAGNOSIS — E11.65 TYPE 2 DIABETES MELLITUS WITH HYPERGLYCEMIA, WITHOUT LONG-TERM CURRENT USE OF INSULIN (HCC): Chronic | ICD-10-CM

## 2025-03-10 DIAGNOSIS — E78.5 DYSLIPIDEMIA: Chronic | ICD-10-CM

## 2025-03-10 DIAGNOSIS — I10 ESSENTIAL HYPERTENSION: Chronic | ICD-10-CM

## 2025-03-11 ENCOUNTER — TELEPHONE (OUTPATIENT)
Dept: MEDICAL GROUP | Facility: PHYSICIAN GROUP | Age: 45
End: 2025-03-11
Payer: COMMERCIAL

## 2025-03-11 NOTE — TELEPHONE ENCOUNTER
DOCUMENTATION OF PAR STATUS:    1. Name of Medication & Dose: Januvia 50mg tab     2. Name of Prescription Coverage Company & phone #: [employer]    3. Date Prior Auth Submitted: 3/11/25    4. What information was given to obtain insurance decision? Cover My Meds    5. Prior Auth Status? Pending    6. Patient Notified: yes

## 2025-03-18 RX ORDER — ALOGLIPTIN 12.5 MG/1
12.5 TABLET, FILM COATED ORAL DAILY
Qty: 100 TABLET | Refills: 3 | Status: SHIPPED | OUTPATIENT
Start: 2025-03-18 | End: 2026-04-22

## 2025-03-18 NOTE — TELEPHONE ENCOUNTER
Prior auth for Januvia was also denied for same reason as Jardiance.  I called [employer] and spoke to representative who stated pt must try Alogliptin first.

## 2025-04-28 ENCOUNTER — OFFICE VISIT (OUTPATIENT)
Dept: URGENT CARE | Facility: PHYSICIAN GROUP | Age: 45
End: 2025-04-28
Payer: COMMERCIAL

## 2025-04-28 VITALS
RESPIRATION RATE: 18 BRPM | SYSTOLIC BLOOD PRESSURE: 132 MMHG | TEMPERATURE: 97.3 F | WEIGHT: 300.49 LBS | HEIGHT: 72 IN | BODY MASS INDEX: 40.7 KG/M2 | DIASTOLIC BLOOD PRESSURE: 92 MMHG | HEART RATE: 85 BPM | OXYGEN SATURATION: 95 %

## 2025-04-28 DIAGNOSIS — M25.561 PAIN AND SWELLING OF RIGHT KNEE: ICD-10-CM

## 2025-04-28 DIAGNOSIS — M25.461 PAIN AND SWELLING OF RIGHT KNEE: ICD-10-CM

## 2025-04-28 RX ORDER — PREDNISONE 20 MG/1
TABLET ORAL
Qty: 10 TABLET | Refills: 0 | Status: SHIPPED | OUTPATIENT
Start: 2025-04-28

## 2025-04-28 ASSESSMENT — FIBROSIS 4 INDEX: FIB4 SCORE: 0.99

## 2025-04-28 NOTE — PROGRESS NOTES
Patient was initially seen by Carlene SANTOS.  I independently evaluated and assessed the patient.  We collaborated on medical decision making and treatment plan.  I agree with assessment and plan.      1. Pain and swelling of right knee  predniSONE (DELTASONE) 20 MG Tab          Jose L Donis PA-C

## 2025-04-28 NOTE — LETTER
April 28, 2025         Patient: Ryan Lora   YOB: 1980   Date of Visit: 4/28/2025           To Whom it May Concern:    Ryan Lora was seen in my clinic on 4/28/2025. Please excuse any absences from work this week due to acute condition.      If you have any questions or concerns, please don't hesitate to call.        Sincerely,           Jose L Donis P.A.-C.  Electronically Signed

## 2025-04-28 NOTE — PROGRESS NOTES
Subjective     Ryan Lora is a 44 y.o. male who presents with Knee Pain (RT knee pain x3 days. Believes it may be a gout flare up. )    Pt reports acute onset of right knee pain which began Saturday and has worsened. He reports his pain as severe today and walking worsens the pain. He denies any fever or chills and is able to bear weight. Patient takes Febuxostat for his gout however has missed a few doses of this medication and has eaten a greater quantity of red meat than his typical diet. Prior gout flares have occurred in his right ankle and toe as well as left great toe but never in the right ankle. He took Naproxen this morning which seemed to relieve his pain for a short time only.        Knee Pain        ROS     No fever or chills, erythema to the right knee. Scar from prior knee surgery in 2000.       Objective     BP (!) 132/92 (BP Location: Left arm, Patient Position: Sitting, BP Cuff Size: Adult long)   Pulse 85   Temp 36.3 °C (97.3 °F) (Temporal)   Resp 18   Ht 1.829 m (6') Comment: PT reported  Wt (!) 136 kg (300 lb 7.8 oz)   SpO2 95%   BMI 40.75 kg/m²      Physical Exam       MSK: Swelling to the right knee. No tenderness to palpation to the right knee joint. No crepitus. Anterior/Posterior drawer negative. Full ROM in flexion, extension, abduction, adduction, internal and external rotation.                Assessment & Plan       Considered septic arthritis, osteoarthritis, and acute gout flare. Septic arthritis less likely given that there is no erythema, the patient has been afebrile, no recent trauma to the area or reported infection. Osteoarthritis is possible as knee is a common area for this condition, the patient also had prior surgery however there was no crepitus and less likely given that his pain was unilateral and acute in nature. Acute gout flare most likely as the patient has a history of gout, though never in this joint before, and states that the pain seems to be  consistent with past gout flare. Plan for 5 day course of prednisone 20mg BID to reduce inflammation. The patient was instructed to return if his symptoms worsen.

## 2025-05-04 ENCOUNTER — HOSPITAL ENCOUNTER (OUTPATIENT)
Dept: RADIOLOGY | Facility: MEDICAL CENTER | Age: 45
End: 2025-05-04
Attending: NURSE PRACTITIONER
Payer: COMMERCIAL

## 2025-05-04 ENCOUNTER — APPOINTMENT (OUTPATIENT)
Dept: URGENT CARE | Facility: PHYSICIAN GROUP | Age: 45
End: 2025-05-04
Payer: COMMERCIAL

## 2025-05-04 ENCOUNTER — OFFICE VISIT (OUTPATIENT)
Dept: URGENT CARE | Facility: PHYSICIAN GROUP | Age: 45
End: 2025-05-04
Payer: COMMERCIAL

## 2025-05-04 VITALS
BODY MASS INDEX: 40.03 KG/M2 | WEIGHT: 295.53 LBS | RESPIRATION RATE: 14 BRPM | SYSTOLIC BLOOD PRESSURE: 138 MMHG | DIASTOLIC BLOOD PRESSURE: 92 MMHG | TEMPERATURE: 97.2 F | HEART RATE: 84 BPM | HEIGHT: 72 IN | OXYGEN SATURATION: 97 %

## 2025-05-04 DIAGNOSIS — M25.461 PAIN AND SWELLING OF RIGHT KNEE: ICD-10-CM

## 2025-05-04 DIAGNOSIS — M25.561 PAIN AND SWELLING OF RIGHT KNEE: ICD-10-CM

## 2025-05-04 DIAGNOSIS — M17.11 ARTHRITIS OF KNEE, RIGHT: ICD-10-CM

## 2025-05-04 PROCEDURE — 3075F SYST BP GE 130 - 139MM HG: CPT | Performed by: NURSE PRACTITIONER

## 2025-05-04 PROCEDURE — 73564 X-RAY EXAM KNEE 4 OR MORE: CPT | Mod: RT

## 2025-05-04 PROCEDURE — 3080F DIAST BP >= 90 MM HG: CPT | Performed by: NURSE PRACTITIONER

## 2025-05-04 PROCEDURE — 99214 OFFICE O/P EST MOD 30 MIN: CPT | Performed by: NURSE PRACTITIONER

## 2025-05-04 RX ORDER — MELOXICAM 15 MG/1
15 TABLET ORAL DAILY
Qty: 30 TABLET | Refills: 0 | Status: SHIPPED | OUTPATIENT
Start: 2025-05-04

## 2025-05-04 ASSESSMENT — VISUAL ACUITY: OU: 1

## 2025-05-04 ASSESSMENT — ENCOUNTER SYMPTOMS
FEVER: 0
NEUROLOGICAL NEGATIVE: 1

## 2025-05-04 ASSESSMENT — FIBROSIS 4 INDEX: FIB4 SCORE: 0.99

## 2025-05-04 NOTE — PROGRESS NOTES
Subjective:     Ryan Lora is a 44 y.o. male who presents for Knee Pain (Right knee pain, got seen 04/28/2025, finished prednisone medication and still having some pain.)       Knee Pain  This is a new problem. Pertinent negatives include no fever.     Seen in  4/28 for R knee pain. Hx gout. Tx prednisone.    Ambient listening software ([employer]) used during this encounter with the consent of the patient. The following text is AI-assisted:    History of Present Illness  The patient presents for evaluation of right knee pain.    Initially seen on 04/28/2025 for right knee pain, prescribed prednisone provided some relief [about 25%], but pain persists. Pain exacerbated by bending knee, and ascending stairs. Discomfort in [bilateral anterior and left posteromedial] knee. Slight reduction in swelling noted last visit. No difficulty in ambulation post-surgery [2000]. No recent injury, fall, fever, or chills. Meloxicam not previously taken. Scheduled appointment with PCP on Wednesday.    PAST SURGICAL HISTORY:  ACL, MCL, PCL, and right meniscus surgery in 2000.    Previous treatment with indomethacin and Aleve provided minimal relief.    Review of Systems   Constitutional:  Negative for fever.   Musculoskeletal:         R knee pain   Neurological: Negative.    All other systems reviewed and are negative.  Refer to HPI for additional details.    During this visit, appropriate PPE was worn, and hand hygiene was performed.    PMH:  has a past medical history of Gout.    MEDS:   Current Outpatient Medications:     meloxicam (MOBIC) 15 MG tablet, Take 1 Tablet by mouth every day., Disp: 30 Tablet, Rfl: 0    rosuvastatin (CRESTOR) 20 MG Tab, TAKE 1 TABLET EVERY EVENING, Disp: 90 Tablet, Rfl: 0    losartan (COZAAR) 50 MG Tab, TAKE 1 TABLET DAILY, Disp: 90 Tablet, Rfl: 3    predniSONE (DELTASONE) 20 MG Tab, Take 2 tabs p.o. daily x 5 days (Patient not taking: Reported on 5/4/2025), Disp: 10 Tablet, Rfl: 0     Alogliptin Benzoate 12.5 MG Tab, Take 12.5 mg by mouth every day., Disp: 100 Tablet, Rfl: 3    febuxostat (ULORIC) 80 MG Tab, TAKE 1 TABLET DAILY, Disp: 90 Tablet, Rfl: 0    Blood Glucose Meter Kit, Freestyle FREEDOM Lite.  Check blood sugar  1x per day . ICD10 code E11.69, Disp: 1 Kit, Rfl: 0    Blood Glucose Test Strips, FREESTYLE FREEDOM LITE . Check blood sugar 1x per day .  ICD10 code E11.69, Disp: 100 Strip, Rfl: 6    Lancets, Per insurance coverage. Check blood sugar 1x per day . ICD10 code E11.69, Disp: 200 Each, Rfl: 6    Alcohol Swabs, Wipe site with prep pad prior to injection 1x daily. ICD10 code E11.69, Disp: 200 Each, Rfl: 6    Cholecalciferol (VITAMIN D3) 50 MCG (2000 UT) Chew Tab, Chew 4,000 Units every day., Disp: , Rfl:     ALLERGIES: No Known Allergies  SURGHX:   Past Surgical History:   Procedure Laterality Date    ARTHROSCOPY, KNEE Right 2000    acl, mcl, meniscus repair     SOCHX:  reports that he has never smoked. He has never used smokeless tobacco. He reports that he does not currently use alcohol. He reports that he does not use drugs.    FH: Per HPI as applicable/pertinent.      Objective:     BP (!) 138/92 (BP Location: Left arm, Patient Position: Sitting, BP Cuff Size: Adult long)   Pulse 84   Temp 36.2 °C (97.2 °F) (Temporal)   Resp 14   Ht 1.829 m (6')   Wt (!) 134 kg (295 lb 8.4 oz)   SpO2 97%   BMI 40.08 kg/m²     Physical Exam  Nursing note reviewed.   Constitutional:       General: He is not in acute distress.     Appearance: He is well-developed. He is not ill-appearing or toxic-appearing.   Eyes:      General: Vision grossly intact.   Cardiovascular:      Rate and Rhythm: Normal rate.   Pulmonary:      Effort: Pulmonary effort is normal. No respiratory distress.   Musculoskeletal:         General: No deformity. Normal range of motion.      Right knee: Swelling (Mild, with increased warmth) present. No deformity, erythema, ecchymosis or lacerations. Normal range of motion.  No tenderness. No LCL laxity, MCL laxity, ACL laxity or PCL laxity. Normal alignment and normal patellar mobility.   Skin:     General: Skin is warm and dry.      Coloration: Skin is not pale.      Findings: No erythema.   Neurological:      Mental Status: He is alert and oriented to person, place, and time.      Motor: No weakness.   Psychiatric:         Behavior: Behavior normal. Behavior is cooperative.     XR R Knee:    Details    Reading Physician Reading Date Result Priority   Ignacio Lizama M.D.  [phone]     5/4/2025      Narrative & Impression     5/4/2025 11:32 AM     HISTORY/REASON FOR EXAM:  Atraumatic Pain/Swelling/Deformity.     TECHNIQUE/EXAM DESCRIPTION AND NUMBER OF VIEWS:  4 views of the RIGHT knee.     COMPARISON: None     FINDINGS:  There is no fracture or dislocation.  The visualized osseous structures are in anatomic alignment.  Prior ACL reconstruction. Tricompartmental degenerative changes with small marginal osteophytes and some joint space narrowing most pronounced in the patellofemoral compartment.  Bone mineralization is age-appropriate..  No significant joint effusion.     IMPRESSION:     Tricompartmental degenerative changes. Prior ACL reconstruction.     No acute osseous abnormality.        Exam Ended: 05/04/25 11:43 AM Last Resulted: 05/04/25 11:45 AM     Radiology report and images reviewed by myself. Per my read: joint space narrowing present.      Assessment/Plan:     1. Pain and swelling of right knee  - DX-KNEE COMPLETE 4+ RIGHT; Future  - Referral to Sports Medicine  - meloxicam (MOBIC) 15 MG tablet; Take 1 Tablet by mouth every day.  Dispense: 30 Tablet; Refill: 0    2. Arthritis of knee, right  - Referral to Sports Medicine  - meloxicam (MOBIC) 15 MG tablet; Take 1 Tablet by mouth every day.  Dispense: 30 Tablet; Refill: 0    Rx as above sent electronically. RICE.    Follow up with sports medicine. Referral placed.    Monitor. Warning signs reviewed. Return precautions  advised.    Discharge summary provided.    Work note provided.     Differential diagnosis, natural history, supportive care, over-the-counter symptom management per 's instructions, close monitoring, and indications for immediate follow-up discussed.     All questions answered. Patient agrees with the plan of care.

## 2025-05-04 NOTE — PATIENT INSTRUCTIONS
Details    Reading Physician Reading Date Result Priority   Ignacio Lizama M.D.  [phone]     5/4/2025      Narrative & Impression     5/4/2025 11:32 AM     HISTORY/REASON FOR EXAM:  Atraumatic Pain/Swelling/Deformity.        TECHNIQUE/EXAM DESCRIPTION AND NUMBER OF VIEWS:  4 views of the RIGHT knee.     COMPARISON: None     FINDINGS:  There is no fracture or dislocation.  The visualized osseous structures are in anatomic alignment.  Prior ACL reconstruction. Tricompartmental degenerative changes with small marginal osteophytes and some joint space narrowing most pronounced in the patellofemoral compartment.  Bone mineralization is age-appropriate..  No significant joint effusion.     IMPRESSION:     Tricompartmental degenerative changes. Prior ACL reconstruction.     No acute osseous abnormality.        Exam Ended: 05/04/25 11:43 AM Last Resulted: 05/04/25 11:45 AM

## 2025-05-04 NOTE — LETTER
May 4, 2025         Patient: Ryan Lora   YOB: 1980   Date of Visit: 5/4/2025           To Whom it May Concern:    Ryan Lora was seen in my clinic on 5/4/2025 due to illness. Due to medical necessity, please excuse patient from work 5/5, 5/6, 5/7.    If you have any questions or concerns, please don't hesitate to call.        Sincerely,         ALEC Doll.  Electronically Signed

## 2025-05-06 RX ORDER — EMPAGLIFLOZIN 10 MG/1
1 TABLET, FILM COATED ORAL
COMMUNITY

## 2025-05-07 ENCOUNTER — OFFICE VISIT (OUTPATIENT)
Dept: MEDICAL GROUP | Facility: PHYSICIAN GROUP | Age: 45
End: 2025-05-07
Payer: COMMERCIAL

## 2025-05-07 ENCOUNTER — HOSPITAL ENCOUNTER (OUTPATIENT)
Facility: MEDICAL CENTER | Age: 45
End: 2025-05-07
Attending: INTERNAL MEDICINE
Payer: COMMERCIAL

## 2025-05-07 VITALS
TEMPERATURE: 97.9 F | BODY MASS INDEX: 40.23 KG/M2 | OXYGEN SATURATION: 97 % | DIASTOLIC BLOOD PRESSURE: 78 MMHG | WEIGHT: 297 LBS | HEIGHT: 72 IN | SYSTOLIC BLOOD PRESSURE: 134 MMHG | HEART RATE: 82 BPM | RESPIRATION RATE: 16 BRPM

## 2025-05-07 DIAGNOSIS — E11.65 DIABETES MELLITUS WITH HYPERGLYCEMIA (HCC): ICD-10-CM

## 2025-05-07 DIAGNOSIS — E11.65 TYPE 2 DIABETES MELLITUS WITH HYPERGLYCEMIA, UNSPECIFIED WHETHER LONG TERM INSULIN USE (HCC): ICD-10-CM

## 2025-05-07 DIAGNOSIS — E78.5 DYSLIPIDEMIA: Chronic | ICD-10-CM

## 2025-05-07 DIAGNOSIS — I10 ESSENTIAL HYPERTENSION: Chronic | ICD-10-CM

## 2025-05-07 DIAGNOSIS — R74.8 LIVER ENZYME ELEVATION: ICD-10-CM

## 2025-05-07 DIAGNOSIS — R79.89 LOW VITAMIN D LEVEL: Chronic | ICD-10-CM

## 2025-05-07 DIAGNOSIS — Z02.9 ADMINISTRATIVE ENCOUNTER: ICD-10-CM

## 2025-05-07 DIAGNOSIS — M10.9 GOUT, UNSPECIFIED CAUSE, UNSPECIFIED CHRONICITY, UNSPECIFIED SITE: Chronic | ICD-10-CM

## 2025-05-07 DIAGNOSIS — Z12.5 SCREENING FOR MALIGNANT NEOPLASM OF PROSTATE: ICD-10-CM

## 2025-05-07 DIAGNOSIS — E66.01 SEVERE OBESITY (HCC): Chronic | ICD-10-CM

## 2025-05-07 LAB
HBA1C MFR BLD: 6.9 % (ref ?–5.8)
POCT INT CON NEG: NEGATIVE
POCT INT CON POS: POSITIVE

## 2025-05-07 PROCEDURE — 82570 ASSAY OF URINE CREATININE: CPT

## 2025-05-07 PROCEDURE — 83036 HEMOGLOBIN GLYCOSYLATED A1C: CPT | Performed by: INTERNAL MEDICINE

## 2025-05-07 PROCEDURE — 99214 OFFICE O/P EST MOD 30 MIN: CPT | Performed by: INTERNAL MEDICINE

## 2025-05-07 PROCEDURE — 82043 UR ALBUMIN QUANTITATIVE: CPT

## 2025-05-07 PROCEDURE — 92250 FUNDUS PHOTOGRAPHY W/I&R: CPT | Mod: TC | Performed by: INTERNAL MEDICINE

## 2025-05-07 PROCEDURE — 3075F SYST BP GE 130 - 139MM HG: CPT | Performed by: INTERNAL MEDICINE

## 2025-05-07 PROCEDURE — 3078F DIAST BP <80 MM HG: CPT | Performed by: INTERNAL MEDICINE

## 2025-05-07 ASSESSMENT — PATIENT HEALTH QUESTIONNAIRE - PHQ9: CLINICAL INTERPRETATION OF PHQ2 SCORE: 0

## 2025-05-07 ASSESSMENT — FIBROSIS 4 INDEX: FIB4 SCORE: 0.99

## 2025-05-07 NOTE — ASSESSMENT & PLAN NOTE
Chronic condition.  Patient presently taking alogliptin 12.5 Mg daily.  Patient denies significant hypoglycemia.  Jardiance 10 mg daily.

## 2025-05-08 ENCOUNTER — RESULTS FOLLOW-UP (OUTPATIENT)
Dept: MEDICAL GROUP | Facility: PHYSICIAN GROUP | Age: 45
End: 2025-05-08

## 2025-05-08 LAB
CREAT UR-MCNC: 112 MG/DL
MICROALBUMIN UR-MCNC: <1.2 MG/DL
MICROALBUMIN/CREAT UR: NORMAL MG/G (ref 0–30)

## 2025-05-08 NOTE — ASSESSMENT & PLAN NOTE
This is a chronic condition.  The patient currently taking losartan.  Patient denies chest pain or shortness of breath.

## 2025-05-08 NOTE — PROGRESS NOTES
PRIMARY CARE CLINIC VISIT        Chief Complaint   Patient presents with    Paperwork     FMLA    Follow-Up     Urgent care/rt knee pain    Medication Refill        Follow-up urgent care visit  [facility] paperwork  Follow-up diabetes  Elevated liver enzyme  Obesity  Hypertension  hyperlipidemia      History of Present Illness     Administrative encounter  Requests [facility] papers to be completed.    Diabetes mellitus with hyperglycemia (HCC)  Chronic condition.  Patient presently taking alogliptin 12.5 Mg daily.  Patient denies significant hypoglycemia.  Jardiance 10 mg daily.    Liver enzyme elevation  Chronic condition.  Previous abdominal ultrasound showed hepatic steatosis.  The patient is due for lab test.    Severe obesity (HCC)  Body mass index is 40.28 kg/m².   Chronic condition.  Recommend pt to follow a healthy , well balance diet  Pt to continue with regular exercise activity and to maintain ideal weight.        Gout  Chronic recurrent condition.  The patient was seen in the urgent care twice due to severe pain right knee.  Patient currently taking Uloric 80 Mg daily.  He also take meloxicam as needed.  The patient has been off of work since April 29 2025 due to pain.    Dyslipidemia  Chronic condition.  The patient currently taking Crestor.  Patient is due for lab test.    Essential hypertension  This is a chronic condition.  The patient currently taking losartan.  Patient denies chest pain or shortness of breath.    Current Outpatient Medications on File Prior to Visit   Medication Sig Dispense Refill    JARDIANCE 10 MG Tab tablet Take 1 Tablet by mouth every day.      meloxicam (MOBIC) 15 MG tablet Take 1 Tablet by mouth every day. 30 Tablet 0    Alogliptin Benzoate 12.5 MG Tab Take 12.5 mg by mouth every day. 100 Tablet 3    febuxostat (ULORIC) 80 MG Tab TAKE 1 TABLET DAILY 90 Tablet 0    rosuvastatin (CRESTOR) 20 MG Tab TAKE 1 TABLET EVERY EVENING 90 Tablet 0    losartan (COZAAR) 50 MG Tab TAKE 1 TABLET  DAILY 90 Tablet 3    Blood Glucose Meter Kit Freestyle FREEDOM Lite.  Check blood sugar  1x per day . ICD10 code E11.69 1 Kit 0    Blood Glucose Test Strips FREESTYLE FREEDOM LITE . Check blood sugar 1x per day .  ICD10 code E11.69 100 Strip 6    Lancets Per insurance coverage. Check blood sugar 1x per day . ICD10 code E11.69 200 Each 6    Alcohol Swabs Wipe site with prep pad prior to injection 1x daily. ICD10 code E11.69 200 Each 6    Cholecalciferol (VITAMIN D3) 50 MCG (2000 UT) Chew Tab Chew 4,000 Units every day.       No current facility-administered medications on file prior to visit.        Allergies: Patient has no known allergies.    Current Outpatient Medications Ordered in Epic   Medication Sig Dispense Refill    JARDIANCE 10 MG Tab tablet Take 1 Tablet by mouth every day.      meloxicam (MOBIC) 15 MG tablet Take 1 Tablet by mouth every day. 30 Tablet 0    Alogliptin Benzoate 12.5 MG Tab Take 12.5 mg by mouth every day. 100 Tablet 3    febuxostat (ULORIC) 80 MG Tab TAKE 1 TABLET DAILY 90 Tablet 0    rosuvastatin (CRESTOR) 20 MG Tab TAKE 1 TABLET EVERY EVENING 90 Tablet 0    losartan (COZAAR) 50 MG Tab TAKE 1 TABLET DAILY 90 Tablet 3    Blood Glucose Meter Kit Freestyle FREEDOM Lite.  Check blood sugar  1x per day . ICD10 code E11.69 1 Kit 0    Blood Glucose Test Strips FREESTYLE FREEDOM LITE . Check blood sugar 1x per day .  ICD10 code E11.69 100 Strip 6    Lancets Per insurance coverage. Check blood sugar 1x per day . ICD10 code E11.69 200 Each 6    Alcohol Swabs Wipe site with prep pad prior to injection 1x daily. ICD10 code E11.69 200 Each 6    Cholecalciferol (VITAMIN D3) 50 MCG (2000 UT) Chew Tab Chew 4,000 Units every day.       No current [facility]-ordered facility-administered medications on file.       Past Medical History:   Diagnosis Date    Gout        Past Surgical History:   Procedure Laterality Date    ARTHROSCOPY, KNEE Right 2000    acl, mcl, meniscus repair       Family History   Problem  Relation Age of Onset    Diabetes Paternal Grandmother     Diabetes Paternal Grandfather        Social History     Tobacco Use   Smoking Status Never   Smokeless Tobacco Never       Social History     Substance and Sexual Activity   Alcohol Use Not Currently       Review of systems  As per HPI above. All other systems reviewed and negative.      Past Medical, Social, and Family history reviewed and updated in [facility]       LAB DATA:     I have independently reviewed / interpreted labs    Lab Results   Component Value Date/Time    HBA1C 6.7 (H) 04/11/2024 07:16 AM    HBA1C 7.5 (H) 01/19/2024 08:39 AM    HBA1C 6.4 (H) 03/24/2022 07:42 AM        Lab Results   Component Value Date/Time    WBC 5.0 04/11/2024 07:16 AM    HEMOGLOBIN 17.4 04/11/2024 07:16 AM    HEMATOCRIT 51.7 04/11/2024 07:16 AM    MCV 90.9 04/11/2024 07:16 AM    PLATELETCT 261 04/11/2024 07:16 AM       Lab Results   Component Value Date/Time    SODIUM 139 04/11/2024 07:16 AM    POTASSIUM 4.1 04/11/2024 07:16 AM    GLUCOSE 112 (H) 04/11/2024 07:16 AM    BUN 10 04/11/2024 07:16 AM    CREATININE 0.85 04/11/2024 07:16 AM       Lab Results   Component Value Date/Time    CHOLSTRLTOT 148 04/11/2024 07:16 AM    TRIGLYCERIDE 122 04/11/2024 07:16 AM    HDL 39 (A) 04/11/2024 07:16 AM    LDL 85 04/11/2024 07:16 AM       Lab Results   Component Value Date/Time    ALTSGPT 72 (H) 04/11/2024 07:16 AM          Objective     /78 (BP Location: Right arm, Patient Position: Sitting, BP Cuff Size: Adult)   Pulse 82   Temp 36.6 °C (97.9 °F) (Temporal)   Resp 16   Ht 1.829 m (6')   Wt (!) 135 kg (297 lb)   SpO2 97%    Body mass index is 40.28 kg/m².    General: alert in no apparent distress.  Cardiovascular: regular rate and rhythm  Pulmonary: lungs : no wheezing   Gastrointestinal: BS present.   Monofilament testing with a 10 gram force: sensation intact: intact bilaterally  Visual Inspection: Feet without maceration, ulcers, fissures.  Pedal pulses: decreased  bilaterally   R Knee : No signficant swelling redness or deformity.   ROM limited due to pain specifically w knee flexion/extension.    Assessment and Plan      1.Type 2 diabetes mellitus with hyperglycemia, unspecified whether long term insulin use (HCC)  Chronic stable condition.  A1c in the office today 6.9%.  Continue alogliptin 12.5 Mg daily.  Clinical notes:   -Discussed with the patient goals for Diabetes management:   HbA1C < 7% /  Fasting BG   /  2 hrs post meal BG below 160  -Reviewed pathophysiology of diabetes and the importance of glycemic control to reduce the risk of diabetes related complications   Microvascular: retinopathy, neuropathy, nephropathy  Macrovascular: heart attack, stroke, peripheral vascular dz  -Advised pt to monitor sugar closely  -Counseled pt the importance of recognizing and treating hypoglycemia.   -The importance of increasing physical activity to improve diabetes control  discussed with the patient.   -Patient was also educated on changing diet and making better choices to help control blood sugar.          - POCT Hemoglobin A1C  - POCT Retinal Eye Exam  - Basic Metabolic Panel; Future  - TSH WITH REFLEX TO FT4; Future  - MICROALBUMIN CREAT RATIO URINE; Future  - Diabetic Monofilament LE Exam    2. Administrative encounter  FMLA paperwork completed for the patient in the office today        3.Screening for malignant neoplasm of prostate  - PROSTATE SPECIFIC AG SCREENING; Future    4. Gout, unspecified cause, unspecified chronicity, unspecified site  Recurrent condition.  Patient was seen in urgent care 2 times.  Patient reported some improvement but the pain persist.  I have written a note for the patient to be off work until May 12, 2025.   URIC ACID; Future    5.Dyslipidemia  Chronic condition.  Current status unclear.  Lab test ordered to check lipid panel.  Continue Crestor 20 Mg daily  - Lipid Profile; Future    6. Low vitamin D level  Condition.  Blood test  requested to check vitamin D level    7. Essential hypertension  Stable condition.  Continue losartan 50 mg daily.  Continue to monitor blood pressure on regular basis at home  - CBC WITH DIFFERENTIAL; Future    8. Liver enzyme elevation  Chronic condition.  Current status unclear.  Lab test ordered for follow-up.  Rec  the patient to lose weight  - HEPATIC FUNCTION PANEL; Future  - MITOCHONDRIAL (M2) AB; Future  - ANTI-SMOOTH MUSCLE ABS; Future  - HEP A AB IGM; Future  - HEP B CORE AB IGM  - HEP B SURFACE ANTIGEN; Future  - HEP C VIRUS ANTIBODY; Future    9.Severe obesity (HCC)  Chronic condition.  Uncontrolled recommend diet and lifestyle modification.  Courage patient to lose weight                      Please note that this dictation was created using voice recognition software. I have made every reasonable attempt to correct obvious errors, but I expect that there are errors of grammar and possibly content that I did not discover before finalizing the note.    Jairon Mccoy MD  Internal Medicine  [facility]

## 2025-05-08 NOTE — ASSESSMENT & PLAN NOTE
Chronic recurrent condition.  The patient was seen in the urgent care twice due to severe pain right knee.  Patient currently taking Uloric 80 Mg daily.  He also take meloxicam as needed.  The patient has been off of work since April 29 2025 due to pain.

## 2025-05-08 NOTE — ASSESSMENT & PLAN NOTE
Chronic condition.  Previous abdominal ultrasound showed hepatic steatosis.  The patient is due for lab test.

## 2025-05-08 NOTE — ASSESSMENT & PLAN NOTE
Body mass index is 40.28 kg/m².   Chronic condition.  Recommend pt to follow a healthy , well balance diet  Pt to continue with regular exercise activity and to maintain ideal weight.

## 2025-05-09 NOTE — Clinical Note
REFERRAL APPROVAL NOTICE         Sent on May 9, 2025                   Ryan Lora  [address] [facility] [address]                   Dear Mr. Lora,    After a careful review of the medical information and benefit coverage, [facility] has processed your referral. See below for additional details.    If applicable, you must be actively enrolled with your insurance for coverage of the authorized service. If you have any questions regarding your coverage, please contact your insurance directly.    REFERRAL INFORMATION   Referral #:  [id number]  Referred-To Department    Referred-By Provider:  Sports Medicine    MAURICE Doll   [facility]      [address]  [address]  [facility] [id number] [phone] [address] [facility] [id number] [phone]    Referral Start Date:  05/04/2025  Referral End Date:   05/04/2026             SCHEDULING  If you do not already have an appointment, please call [phone] to make an appointment.     MORE INFORMATION  If you do not already have a [employer] account, sign up at: [email or web address]  You can access your medical information, make appointments, see lab results, billing information, and more.  If you have questions regarding this referral, please contact  the [facility] Referrals department at:             [phone]. Monday - Friday 8:00AM - 5:00PM.     Sincerely,    [facility]

## 2025-05-11 DIAGNOSIS — M10.9 GOUT, UNSPECIFIED CAUSE, UNSPECIFIED CHRONICITY, UNSPECIFIED SITE: Chronic | ICD-10-CM

## 2025-05-12 LAB — RETINAL SCREEN: NEGATIVE

## 2025-05-13 RX ORDER — FEBUXOSTAT 80 MG/1
TABLET, FILM COATED ORAL
Qty: 90 TABLET | Refills: 0 | Status: SHIPPED | OUTPATIENT
Start: 2025-05-13

## 2025-05-13 NOTE — TELEPHONE ENCOUNTER
Received request via: Patient    Was the patient seen in the last year in this department? Yes    Does the patient have an active prescription (recently filled or refills available) for medication(s) requested? No    Pharmacy Name: [facility] - [place], [place] - [address]     Does the patient have alf Plus and need 100-day supply? (This applies to ALL medications) Patient does not have SCP

## 2025-05-18 ENCOUNTER — RESULTS FOLLOW-UP (OUTPATIENT)
Dept: MEDICAL GROUP | Facility: PHYSICIAN GROUP | Age: 45
End: 2025-05-18
Payer: COMMERCIAL

## 2025-05-20 SDOH — ECONOMIC STABILITY: INCOME INSECURITY: IN THE LAST 12 MONTHS, WAS THERE A TIME WHEN YOU WERE NOT ABLE TO PAY THE MORTGAGE OR RENT ON TIME?: NO

## 2025-05-20 SDOH — ECONOMIC STABILITY: TRANSPORTATION INSECURITY
IN THE PAST 12 MONTHS, HAS LACK OF RELIABLE TRANSPORTATION KEPT YOU FROM MEDICAL APPOINTMENTS, MEETINGS, WORK OR FROM GETTING THINGS NEEDED FOR DAILY LIVING?: NO

## 2025-05-20 SDOH — ECONOMIC STABILITY: TRANSPORTATION INSECURITY
IN THE PAST 12 MONTHS, HAS THE LACK OF TRANSPORTATION KEPT YOU FROM MEDICAL APPOINTMENTS OR FROM GETTING MEDICATIONS?: NO

## 2025-05-20 SDOH — ECONOMIC STABILITY: FOOD INSECURITY: WITHIN THE PAST 12 MONTHS, YOU WORRIED THAT YOUR FOOD WOULD RUN OUT BEFORE YOU GOT MONEY TO BUY MORE.: NEVER TRUE

## 2025-05-20 SDOH — ECONOMIC STABILITY: FOOD INSECURITY: WITHIN THE PAST 12 MONTHS, THE FOOD YOU BOUGHT JUST DIDN'T LAST AND YOU DIDN'T HAVE MONEY TO GET MORE.: NEVER TRUE

## 2025-05-20 SDOH — HEALTH STABILITY: PHYSICAL HEALTH: ON AVERAGE, HOW MANY DAYS PER WEEK DO YOU ENGAGE IN MODERATE TO STRENUOUS EXERCISE (LIKE A BRISK WALK)?: 4 DAYS

## 2025-05-20 SDOH — HEALTH STABILITY: PHYSICAL HEALTH: ON AVERAGE, HOW MANY MINUTES DO YOU ENGAGE IN EXERCISE AT THIS LEVEL?: 60 MIN

## 2025-05-20 SDOH — ECONOMIC STABILITY: TRANSPORTATION INSECURITY
IN THE PAST 12 MONTHS, HAS LACK OF TRANSPORTATION KEPT YOU FROM MEETINGS, WORK, OR FROM GETTING THINGS NEEDED FOR DAILY LIVING?: NO

## 2025-05-20 SDOH — ECONOMIC STABILITY: INCOME INSECURITY: HOW HARD IS IT FOR YOU TO PAY FOR THE VERY BASICS LIKE FOOD, HOUSING, MEDICAL CARE, AND HEATING?: NOT HARD AT ALL

## 2025-05-20 SDOH — ECONOMIC STABILITY: HOUSING INSECURITY
IN THE LAST 12 MONTHS, WAS THERE A TIME WHEN YOU DID NOT HAVE A STEADY PLACE TO SLEEP OR SLEPT IN A SHELTER (INCLUDING NOW)?: NO

## 2025-05-20 SDOH — HEALTH STABILITY: MENTAL HEALTH
STRESS IS WHEN SOMEONE FEELS TENSE, NERVOUS, ANXIOUS, OR CAN'T SLEEP AT NIGHT BECAUSE THEIR MIND IS TROUBLED. HOW STRESSED ARE YOU?: NOT AT ALL

## 2025-05-20 ASSESSMENT — LIFESTYLE VARIABLES
HOW OFTEN DO YOU HAVE SIX OR MORE DRINKS ON ONE OCCASION: NEVER
HOW OFTEN DO YOU HAVE A DRINK CONTAINING ALCOHOL: NEVER
SKIP TO QUESTIONS 9-10: 1
AUDIT-C TOTAL SCORE: 0
HOW MANY STANDARD DRINKS CONTAINING ALCOHOL DO YOU HAVE ON A TYPICAL DAY: PATIENT DOES NOT DRINK

## 2025-05-20 ASSESSMENT — SOCIAL DETERMINANTS OF HEALTH (SDOH)
IN THE PAST 12 MONTHS, HAS THE ELECTRIC, GAS, OIL, OR WATER COMPANY THREATENED TO SHUT OFF SERVICE IN YOUR HOME?: NO
HOW OFTEN DO YOU HAVE A DRINK CONTAINING ALCOHOL: NEVER
HOW OFTEN DO YOU HAVE SIX OR MORE DRINKS ON ONE OCCASION: NEVER
IN A TYPICAL WEEK, HOW MANY TIMES DO YOU TALK ON THE PHONE WITH FAMILY, FRIENDS, OR NEIGHBORS?: MORE THAN THREE TIMES A WEEK
HOW OFTEN DO YOU ATTENT MEETINGS OF THE CLUB OR ORGANIZATION YOU BELONG TO?: MORE THAN 4 TIMES PER YEAR
HOW OFTEN DO YOU GET TOGETHER WITH FRIENDS OR RELATIVES?: TWICE A WEEK
HOW HARD IS IT FOR YOU TO PAY FOR THE VERY BASICS LIKE FOOD, HOUSING, MEDICAL CARE, AND HEATING?: NOT HARD AT ALL
HOW OFTEN DO YOU ATTEND CHURCH OR RELIGIOUS SERVICES?: MORE THAN 4 TIMES PER YEAR
WITHIN THE PAST 12 MONTHS, YOU WORRIED THAT YOUR FOOD WOULD RUN OUT BEFORE YOU GOT THE MONEY TO BUY MORE: NEVER TRUE
HOW MANY DRINKS CONTAINING ALCOHOL DO YOU HAVE ON A TYPICAL DAY WHEN YOU ARE DRINKING: PATIENT DOES NOT DRINK
DO YOU BELONG TO ANY CLUBS OR ORGANIZATIONS SUCH AS CHURCH GROUPS UNIONS, FRATERNAL OR ATHLETIC GROUPS, OR SCHOOL GROUPS?: YES
HOW OFTEN DO YOU GET TOGETHER WITH FRIENDS OR RELATIVES?: TWICE A WEEK
HOW OFTEN DO YOU ATTEND CHURCH OR RELIGIOUS SERVICES?: MORE THAN 4 TIMES PER YEAR
DO YOU BELONG TO ANY CLUBS OR ORGANIZATIONS SUCH AS CHURCH GROUPS UNIONS, FRATERNAL OR ATHLETIC GROUPS, OR SCHOOL GROUPS?: YES
IN A TYPICAL WEEK, HOW MANY TIMES DO YOU TALK ON THE PHONE WITH FAMILY, FRIENDS, OR NEIGHBORS?: MORE THAN THREE TIMES A WEEK
HOW OFTEN DO YOU ATTENT MEETINGS OF THE CLUB OR ORGANIZATION YOU BELONG TO?: MORE THAN 4 TIMES PER YEAR

## 2025-05-21 ENCOUNTER — OFFICE VISIT (OUTPATIENT)
Dept: SPORTS MEDICINE | Facility: OTHER | Age: 45
End: 2025-05-21
Attending: NURSE PRACTITIONER
Payer: COMMERCIAL

## 2025-05-21 VITALS
HEIGHT: 72 IN | DIASTOLIC BLOOD PRESSURE: 78 MMHG | BODY MASS INDEX: 40.23 KG/M2 | OXYGEN SATURATION: 94 % | SYSTOLIC BLOOD PRESSURE: 122 MMHG | RESPIRATION RATE: 18 BRPM | HEART RATE: 88 BPM | TEMPERATURE: 97.8 F | WEIGHT: 297 LBS

## 2025-05-21 DIAGNOSIS — M25.561 ACUTE PAIN OF RIGHT KNEE: Primary | ICD-10-CM

## 2025-05-21 PROCEDURE — 3074F SYST BP LT 130 MM HG: CPT | Performed by: FAMILY MEDICINE

## 2025-05-21 PROCEDURE — 3078F DIAST BP <80 MM HG: CPT | Performed by: FAMILY MEDICINE

## 2025-05-21 PROCEDURE — 99213 OFFICE O/P EST LOW 20 MIN: CPT | Performed by: FAMILY MEDICINE

## 2025-05-21 ASSESSMENT — ENCOUNTER SYMPTOMS
SENSORY CHANGE: 0
TINGLING: 0

## 2025-05-21 ASSESSMENT — FIBROSIS 4 INDEX: FIB4 SCORE: 0.99

## 2025-05-21 NOTE — PROGRESS NOTES
Subjective:     Ryan Lora is a 44 y.o. male who presents for Knee Pain (R knee pain )    HPI  Pt presents for evaluation of an acute problem  Pt with new right knee pain and swelling the past few weeks   Started without fall or injury  Pain was an aching type pain and worse with ambulation  Initially seen in urgent care and given prednisone  Made good improvements, but did not fully resolve and return to urgent care where he was given meloxicam  Overall making good improvements and rates his pain as a 1/10 currently  Pain stays focal in the knee and does not radiate down the leg  No numbness or tingling  Has history of gout and has not had in the knee before, but suspects that this was gout  He recently missed his daily preventative gout medication for a week before the symptoms started, and has now been able to get his medication back    Review of Systems   Skin:  Negative for rash.   Neurological:  Negative for tingling and sensory change.     PMH:  has a past medical history of Gout.  MEDS: Current Medications[1]  ALLERGIES: Allergies[2]  SURGHX: Past Surgical History[3]  SOCHX:  reports that he has never smoked. He has never used smokeless tobacco. He reports that he does not currently use alcohol. He reports that he does not use drugs.     Objective:   /78 (BP Location: Left arm, Patient Position: Sitting, BP Cuff Size: Large adult)   Pulse 88   Temp 36.6 °C (97.8 °F) (Temporal)   Resp 18   Ht 1.829 m (6')   Wt (!) 135 kg (297 lb)   SpO2 94%   BMI 40.28 kg/m²     Physical Exam  Constitutional:       General: He is not in acute distress.     Appearance: He is well-developed. He is not diaphoretic.   Pulmonary:      Effort: Pulmonary effort is normal.   Neurological:      Mental Status: He is alert.     Right knee  Appearance - No bruising, erythema, or deformity appreciated  Palpation - No tenderness to palpation along joint lines, patellar tendon, hamstring tendons, or quads.  No  palpable effusion.  ROM - FROM with mild crepitus  Strength - 5/5 throughout  Neuro - Sensation intact  Special testing - No laxity or pain with varus/valgus stress, neg anterior drawer, neg posterior drawer, neg Lachman's, neg Maty's, neg patellar apprehension test    Right knee xray 5/4/25   Tricompartmental degenerative changes. Prior ACL reconstruction.   No acute osseous abnormality.    Assessment/Plan:   Assessment    1. Acute pain of right knee    Patient with gout of the right knee which is made good improvements after prednisone and meloxicam.  At this point, he has no tenderness, full range of motion, and good function.  He still has a little bit of pain, but greatly improved.  Recommended that he take meloxicam until he has been pain-free for at least 1 or 2 days.  As long as the pain does not recur, no further follow-up required.  If the pain is not resolving, next step could include physical therapy or intra-articular injection.  He was given handout with stretches and exercises to start working on.  Follow-up as needed.         [1]   Current Outpatient Medications:     febuxostat (ULORIC) 80 MG Tab, TAKE 1 TABLET DAILY, Disp: 90 Tablet, Rfl: 0    JARDIANCE 10 MG Tab tablet, Take 1 Tablet by mouth every day., Disp: , Rfl:     meloxicam (MOBIC) 15 MG tablet, Take 1 Tablet by mouth every day., Disp: 30 Tablet, Rfl: 0    Alogliptin Benzoate 12.5 MG Tab, Take 12.5 mg by mouth every day., Disp: 100 Tablet, Rfl: 3    rosuvastatin (CRESTOR) 20 MG Tab, TAKE 1 TABLET EVERY EVENING, Disp: 90 Tablet, Rfl: 0    losartan (COZAAR) 50 MG Tab, TAKE 1 TABLET DAILY, Disp: 90 Tablet, Rfl: 3    Blood Glucose Meter Kit, Freestyle FREEDOM Lite.  Check blood sugar  1x per day . ICD10 code E11.69, Disp: 1 Kit, Rfl: 0    Blood Glucose Test Strips, FREESTYLE FREEDOM LITE . Check blood sugar 1x per day .  ICD10 code E11.69, Disp: 100 Strip, Rfl: 6    Lancets, Per insurance coverage. Check blood sugar 1x per day . ICD10 code  E11.69, Disp: 200 Each, Rfl: 6    Alcohol Swabs, Wipe site with prep pad prior to injection 1x daily. ICD10 code E11.69, Disp: 200 Each, Rfl: 6    Cholecalciferol (VITAMIN D3) 50 MCG (2000 UT) Chew Tab, Chew 4,000 Units every day., Disp: , Rfl:   [2] No Known Allergies  [3]   Past Surgical History:  Procedure Laterality Date    ARTHROSCOPY, KNEE Right 2000    acl, mcl, meniscus repair

## 2025-06-09 DIAGNOSIS — E78.5 DYSLIPIDEMIA: ICD-10-CM

## 2025-06-09 RX ORDER — ROSUVASTATIN CALCIUM 20 MG/1
20 TABLET, COATED ORAL EVERY EVENING
Qty: 90 TABLET | Refills: 3 | Status: SHIPPED
Start: 2025-06-09 | End: 2025-06-21

## 2025-06-09 NOTE — TELEPHONE ENCOUNTER
Received request via: Pharmacy    Was the patient seen in the last year in this department? Yes    Does the patient have an active prescription (recently filled or refills available) for medication(s) requested? No    Pharmacy Name: [facility] - [place], [place] - [address] AT [place] & [address]     Does the patient have custodial Plus and need 100-day supply? (This applies to ALL medications) Patient does not have SCP

## 2025-06-10 RX ORDER — ALOGLIPTIN 12.5 MG/1
12.5 TABLET, FILM COATED ORAL DAILY
Qty: 100 TABLET | Refills: 3 | Status: SHIPPED | OUTPATIENT
Start: 2025-06-10 | End: 2026-07-15

## 2025-06-11 NOTE — TELEPHONE ENCOUNTER
Received request via: Pharmacy    Was the patient seen in the last year in this department? Yes    Does the patient have an active prescription (recently filled or refills available) for medication(s) requested? Yes. Pharmacy change    Pharmacy Name:     [facility] - [place] - [address] [facility] [address]  [address]  [facility] [address]  Phone: [phone] Fax: [phone]          Does the patient have jail Plus and need 100-day supply? (This applies to ALL medications) Patient does not have SCP

## 2025-06-18 DIAGNOSIS — I10 ESSENTIAL HYPERTENSION: ICD-10-CM

## 2025-06-19 ENCOUNTER — HOSPITAL ENCOUNTER (OUTPATIENT)
Dept: LAB | Facility: MEDICAL CENTER | Age: 45
End: 2025-06-19
Attending: INTERNAL MEDICINE
Payer: COMMERCIAL

## 2025-06-19 DIAGNOSIS — R74.8 LIVER ENZYME ELEVATION: ICD-10-CM

## 2025-06-19 DIAGNOSIS — M10.9 GOUT, UNSPECIFIED CAUSE, UNSPECIFIED CHRONICITY, UNSPECIFIED SITE: Chronic | ICD-10-CM

## 2025-06-19 DIAGNOSIS — E11.65 TYPE 2 DIABETES MELLITUS WITH HYPERGLYCEMIA, UNSPECIFIED WHETHER LONG TERM INSULIN USE (HCC): ICD-10-CM

## 2025-06-19 DIAGNOSIS — Z12.5 SCREENING FOR MALIGNANT NEOPLASM OF PROSTATE: ICD-10-CM

## 2025-06-19 DIAGNOSIS — E78.5 DYSLIPIDEMIA: Chronic | ICD-10-CM

## 2025-06-19 DIAGNOSIS — I10 ESSENTIAL HYPERTENSION: Chronic | ICD-10-CM

## 2025-06-19 LAB
ALBUMIN SERPL BCP-MCNC: 4.3 G/DL (ref 3.2–4.9)
ALP SERPL-CCNC: 66 U/L (ref 30–99)
ALT SERPL-CCNC: 47 U/L (ref 2–50)
ANION GAP SERPL CALC-SCNC: 9 MMOL/L (ref 7–16)
AST SERPL-CCNC: 41 U/L (ref 12–45)
BASOPHILS # BLD AUTO: 1 % (ref 0–1.8)
BASOPHILS # BLD: 0.05 K/UL (ref 0–0.12)
BILIRUB CONJ SERPL-MCNC: <0.2 MG/DL (ref 0.1–0.5)
BILIRUB INDIRECT SERPL-MCNC: NORMAL MG/DL (ref 0–1)
BILIRUB SERPL-MCNC: 0.5 MG/DL (ref 0.1–1.5)
BUN SERPL-MCNC: 11 MG/DL (ref 8–22)
CALCIUM SERPL-MCNC: 9.2 MG/DL (ref 8.5–10.5)
CHLORIDE SERPL-SCNC: 105 MMOL/L (ref 96–112)
CHOLEST SERPL-MCNC: 185 MG/DL (ref 100–199)
CO2 SERPL-SCNC: 25 MMOL/L (ref 20–33)
CREAT SERPL-MCNC: 1 MG/DL (ref 0.5–1.4)
EOSINOPHIL # BLD AUTO: 0.25 K/UL (ref 0–0.51)
EOSINOPHIL NFR BLD: 5 % (ref 0–6.9)
ERYTHROCYTE [DISTWIDTH] IN BLOOD BY AUTOMATED COUNT: 44 FL (ref 35.9–50)
FASTING STATUS PATIENT QL REPORTED: NORMAL
GFR SERPLBLD CREATININE-BSD FMLA CKD-EPI: 95 ML/MIN/1.73 M 2
GLUCOSE SERPL-MCNC: 141 MG/DL (ref 65–99)
HAV IGM SERPL QL IA: NORMAL
HBV CORE IGM SER QL: NORMAL
HBV SURFACE AG SER QL: NORMAL
HCT VFR BLD AUTO: 51.1 % (ref 42–52)
HCV AB SER QL: NORMAL
HDLC SERPL-MCNC: 33 MG/DL
HGB BLD-MCNC: 17 G/DL (ref 14–18)
IMM GRANULOCYTES # BLD AUTO: 0.01 K/UL (ref 0–0.11)
IMM GRANULOCYTES NFR BLD AUTO: 0.2 % (ref 0–0.9)
LDLC SERPL CALC-MCNC: 112 MG/DL
LYMPHOCYTES # BLD AUTO: 1.8 K/UL (ref 1–4.8)
LYMPHOCYTES NFR BLD: 35.9 % (ref 22–41)
MCH RBC QN AUTO: 30.5 PG (ref 27–33)
MCHC RBC AUTO-ENTMCNC: 33.3 G/DL (ref 32.3–36.5)
MCV RBC AUTO: 91.6 FL (ref 81.4–97.8)
MONOCYTES # BLD AUTO: 0.46 K/UL (ref 0–0.85)
MONOCYTES NFR BLD AUTO: 9.2 % (ref 0–13.4)
NEUTROPHILS # BLD AUTO: 2.44 K/UL (ref 1.82–7.42)
NEUTROPHILS NFR BLD: 48.7 % (ref 44–72)
NRBC # BLD AUTO: 0 K/UL
NRBC BLD-RTO: 0 /100 WBC (ref 0–0.2)
PLATELET # BLD AUTO: 257 K/UL (ref 164–446)
PMV BLD AUTO: 10 FL (ref 9–12.9)
POTASSIUM SERPL-SCNC: 4.7 MMOL/L (ref 3.6–5.5)
PROT SERPL-MCNC: 7.5 G/DL (ref 6–8.2)
PSA SERPL DL<=0.01 NG/ML-MCNC: 0.5 NG/ML (ref 0–4)
RBC # BLD AUTO: 5.58 M/UL (ref 4.7–6.1)
SODIUM SERPL-SCNC: 139 MMOL/L (ref 135–145)
TRIGL SERPL-MCNC: 200 MG/DL (ref 0–149)
TSH SERPL DL<=0.005 MIU/L-ACNC: 1.22 UIU/ML (ref 0.38–5.33)
URATE SERPL-MCNC: 3.9 MG/DL (ref 2.5–8.3)
WBC # BLD AUTO: 5 K/UL (ref 4.8–10.8)

## 2025-06-19 PROCEDURE — 84550 ASSAY OF BLOOD/URIC ACID: CPT

## 2025-06-19 PROCEDURE — 84153 ASSAY OF PSA TOTAL: CPT

## 2025-06-19 PROCEDURE — 80074 ACUTE HEPATITIS PANEL: CPT

## 2025-06-19 PROCEDURE — 86015 ACTIN ANTIBODY EACH: CPT

## 2025-06-19 PROCEDURE — 86381 MITOCHONDRIAL ANTIBODY EACH: CPT

## 2025-06-19 PROCEDURE — 84443 ASSAY THYROID STIM HORMONE: CPT

## 2025-06-19 PROCEDURE — 36415 COLL VENOUS BLD VENIPUNCTURE: CPT

## 2025-06-19 PROCEDURE — 80061 LIPID PANEL: CPT

## 2025-06-19 PROCEDURE — 80048 BASIC METABOLIC PNL TOTAL CA: CPT

## 2025-06-19 PROCEDURE — 85025 COMPLETE CBC W/AUTO DIFF WBC: CPT

## 2025-06-19 PROCEDURE — 80076 HEPATIC FUNCTION PANEL: CPT

## 2025-06-19 RX ORDER — LOSARTAN POTASSIUM 50 MG/1
50 TABLET ORAL DAILY
Qty: 90 TABLET | Refills: 3 | Status: SHIPPED | OUTPATIENT
Start: 2025-06-19

## 2025-06-19 NOTE — TELEPHONE ENCOUNTER
Received request via: Patient    Was the patient seen in the last year in this department? Yes    Does the patient have an active prescription (recently filled or refills available) for medication(s) requested? Yes. Pharmacy change    Pharmacy Name:   [facility] - [place] - [address] AT [address] [address] [place]  [address]  [facility] [address]  Phone: [phone] Fax: [phone]      Does the patient have intermediate Plus and need 100-day supply? (This applies to ALL medications) Patient does not have SCP

## 2025-06-21 LAB
MITOCHONDRIA M2 IGG SER-ACNC: 4.6 UNITS (ref 0–24.9)
SMA IGG SER-ACNC: 9 UNITS (ref 0–19)

## 2025-08-14 DIAGNOSIS — M10.9 GOUT, UNSPECIFIED CAUSE, UNSPECIFIED CHRONICITY, UNSPECIFIED SITE: Chronic | ICD-10-CM

## 2025-08-14 RX ORDER — FEBUXOSTAT 80 MG/1
80 TABLET, FILM COATED ORAL DAILY
Qty: 90 TABLET | Refills: 3 | Status: SHIPPED | OUTPATIENT
Start: 2025-08-14